# Patient Record
Sex: FEMALE | Race: WHITE | Employment: UNEMPLOYED | URBAN - NONMETROPOLITAN AREA
[De-identification: names, ages, dates, MRNs, and addresses within clinical notes are randomized per-mention and may not be internally consistent; named-entity substitution may affect disease eponyms.]

---

## 2018-02-04 ENCOUNTER — HISTORY (OUTPATIENT)
Dept: FAMILY MEDICINE | Facility: OTHER | Age: 33
End: 2018-02-04

## 2018-02-04 ENCOUNTER — OFFICE VISIT - GICH (OUTPATIENT)
Dept: FAMILY MEDICINE | Facility: OTHER | Age: 33
End: 2018-02-04

## 2018-02-04 DIAGNOSIS — F15.10 OTHER STIMULANT ABUSE, UNCOMPLICATED (H): ICD-10-CM

## 2018-02-04 DIAGNOSIS — Z32.00 ENCOUNTER FOR PREGNANCY TEST: ICD-10-CM

## 2018-02-04 DIAGNOSIS — F11.10 UNCOMPLICATED OPIOID ABUSE (H): ICD-10-CM

## 2018-02-04 DIAGNOSIS — Z72.51 HIGH RISK HETEROSEXUAL BEHAVIOR: ICD-10-CM

## 2018-02-04 DIAGNOSIS — Z20.2 CONTACT WITH AND (SUSPECTED) EXPOSURE TO INFECTIONS WITH A PREDOMINANTLY SEXUAL MODE OF TRANSMISSION: ICD-10-CM

## 2018-02-04 LAB — HCG UR QL: NEGATIVE

## 2018-02-04 ASSESSMENT — PATIENT HEALTH QUESTIONNAIRE - PHQ9: SUM OF ALL RESPONSES TO PHQ QUESTIONS 1-9: 21

## 2018-02-05 LAB
HBSAG CATEGORY - HISTORICAL: NONREACTIVE
HIV-1/HIV-2 ANTIBODY CATEGORY - HISTORICAL: NORMAL

## 2018-02-06 LAB
HEPATITIS C ANTIBODY CATEGORY - HISTORICAL: ABNORMAL
TREPONEMA PALLIDUM - HISTORICAL: NEGATIVE

## 2018-02-08 LAB — HCV RNA RT-PCR - HISTORICAL: ABNORMAL IU/ML

## 2018-02-09 VITALS
DIASTOLIC BLOOD PRESSURE: 68 MMHG | WEIGHT: 131.25 LBS | TEMPERATURE: 98.4 F | HEART RATE: 64 BPM | SYSTOLIC BLOOD PRESSURE: 120 MMHG

## 2018-02-11 ASSESSMENT — PATIENT HEALTH QUESTIONNAIRE - PHQ9: SUM OF ALL RESPONSES TO PHQ QUESTIONS 1-9: 21

## 2018-02-13 ENCOUNTER — TELEPHONE (OUTPATIENT)
Dept: FAMILY MEDICINE | Facility: OTHER | Age: 33
End: 2018-02-13

## 2018-02-13 NOTE — LETTER
February 15, 2018      Crys Hidalgo  [address]   [place] [address]        Dear ,    We are writing to inform you of your test results.    I did receive the report of your laboratory in the test did come back positive for hepatitis C.  I would recommend that you follow-up with your primary care physician for further recommendations.      If you have any questions or concerns, please call the clinic at the number listed above.       Sincerely,  Kd Bauer MD on 2/15/2018 at 3:16 PM      Kd Bauer MD

## 2018-02-13 NOTE — NURSING NOTE
Patient Information     Patient Name MRN Sex Crys Anderson [id number] Female 1985      Nursing Note by Amanda King at 2018 12:30 PM     Author:  Amanda King Service:  (none) Author Type:  (none)     Filed:  2018 12:12 PM Encounter Date:  2018 Status:  Signed     :  Amanda King            Patient presents to the clinic for vaginal discharge and odor that started 2 days ago. Patient has concerns regarding possible std and would also like to get a pregnancy test.  Amanda RODRIGUEZ CMA..AAMA.....2018..12:01 PM

## 2018-02-13 NOTE — TELEPHONE ENCOUNTER
Transferred from [facility]:  Patient is at [facility]. Please advise her at the hepatitis C is positive. She should follow-up with her primary doctor when she is discharged.   Left message for [facility] to call back.  Parisa Rob LPN ....................  2/13/2018   9:37 AM

## 2018-02-13 NOTE — PROGRESS NOTES
Patient Information     Patient Name MRN Crys Tesfaye [id number] Female 1985      Progress Notes by Kd Bauer MD at 2018 12:30 PM     Author:  Kd Bauer MD Service:  (none) Author Type:  Physician     Filed:  2018 12:51 PM Encounter Date:  2018 Status:  Signed     :  Kd Bauer MD (Physician)            SUBJECTIVE:    Crys Hidalgo is a 32 y.o. female who presents for STD check    HPI Comments: Patient arrives here requesting an STD check. She is currently at [facility] for math amphetamine and opiate abuse. She has used IV drugs in the past. Also reports that she's been very promiscuous. She states her  has a history of hepatitis C which she did not know until recently. She is requesting a STD workup. She does report a vaginal discharge. Patient states that she does have a history of bacterial vaginosis but this is different. She does decline a pelvic exam.      No Known Allergies,   Family History      Problem  Relation Age of Onset     Good Health Mother      Good Health Father      Good Health Daughter      Good Health Son      Good Health Daughter      Good Health Daughter    ,   Current Outpatient Prescriptions on File Prior to Visit       Medication  Sig Dispense Refill     FLUoxetine (PROZAC) 20 mg capsule Take 1 capsule by mouth every morning. 30 capsule 3     ibuprofen (ADVIL; MOTRIN) 800 mg tablet Take 1 tablet by mouth 3 times daily with meals.  0     prazosin (MINIPRESS) 1 mg capsule Take 1 capsule by mouth at bedtime. 30 capsule 3     traZODone (DESYREL) 50 mg tablet Take 1 tablet by mouth at bedtime. 30 tablet 4     No current facility-administered medications on file prior to visit.    , No past surgical history on file.,   Social History      Substance Use Topics        Smoking status:  Current Some Day Smoker     Packs/day: 0.50     Types: Cigarettes     Smokeless tobacco:  Never Used     Alcohol use  No     and   Social History      Substance Use Topics        Smoking status:  Current Some Day Smoker     Packs/day: 0.50     Types: Cigarettes     Smokeless tobacco:  Never Used     Alcohol use  No       REVIEW OF SYSTEMS:  ROS    OBJECTIVE:  /68 (Cuff Site: Left Arm, Position: Sitting, Cuff Size: Adult Regular)  Pulse 64  Temp 98.4  F (36.9  C) (Tympanic)  Wt 59.5 kg (131 lb 4 oz)  LMP 01/14/2018  Breastfeeding? No  BMI 20.56 kg/m2    EXAM:   Physical Exam   Constitutional: She is well-developed, well-nourished, and in no distress.   HENT:   Head: Normocephalic and atraumatic.   Eyes: Pupils are equal, round, and reactive to light.   Neck: Normal range of motion.   Pulmonary/Chest: Effort normal.   Neurological: She is alert.   Psychiatric: Affect normal.       ASSESSMENT/PLAN:    ICD-10-CM    1. Possible exposure to STD Z20.2 GC CHLAMYDIA TRACH PROBE      GC CHLAMYDIA TRACH PROBE   2. Possible pregnancy Z32.00 Urine pregnancy test (HCG), qualitative      Urine pregnancy test (HCG), qualitative   3. Methamphetamine abuse F15.10    4. High risk sexual behavior Z72.51 HIV 1 & 2      ANTI HCV      HBSAG (HBS)      TREPONEMA PALLIDUM      HIV 1 & 2      ANTI HCV      HBSAG (HBS)      TREPONEMA PALLIDUM      CANCELED: TREPONEMA PALLIDUM   5. Opiate abuse, episodic F11.10         Plan:  Laboratories pending. Advised patient if she is positive we'll attempt to get a hold of her at [facility]. She indicates that the plan is to [place] her [place]. Advised her if they're normal I will send her the report in the mail. She is agreeable.

## 2018-02-15 NOTE — TELEPHONE ENCOUNTER
Called [facility] no patient by this name, please mail letter to home address. Janette Mckay LPN .......................2/15/2018  12:36 PM

## 2018-07-24 NOTE — PROGRESS NOTES
Patient Information     Patient Name  Crys Pandey MRN  [id number] Sex  Female   1985      Letter by Kd Bauer MD at      Author:  Kd Bauer MD Service:  (none) Author Type:  (none)    Filed:   Encounter Date:  2018 Status:  (Other)           Crys Hidalgo  [address]  [facility] [place] [facility] [address]          2018    Dear Ms. Hidalgo:    Enclosed is a fear of your tests that have so far come back as you can see there negative. We are still waiting for about 4 tests which have been sent out. I'll let should know those results when I receive them.  Results for orders placed or performed in visit on 18       GC CHLAMYDIA TRACH PROBE       Result  Value Ref Range Status    CHLAMYDIA PCR NOT Detected NOT Detected, Invalid Final    N GONORRHOEAE PCR NOT Detected NOT Detected, Invalid Final   Urine pregnancy test (HCG), qualitative       Result  Value Ref Range Status    PREGNANCY,URINE           Negative Negative Final       Kd Bauer MD ....................  2018   8:14 AM

## 2018-08-13 ENCOUNTER — HOSPITAL ENCOUNTER (EMERGENCY)
Facility: OTHER | Age: 33
Discharge: HOME | End: 2018-08-13
Attending: EMERGENCY MEDICINE | Admitting: EMERGENCY MEDICINE

## 2018-08-13 VITALS
SYSTOLIC BLOOD PRESSURE: 123 MMHG | WEIGHT: 155 LBS | DIASTOLIC BLOOD PRESSURE: 80 MMHG | OXYGEN SATURATION: 97 % | BODY MASS INDEX: 24.33 KG/M2 | HEIGHT: 67 IN | RESPIRATION RATE: 16 BRPM | HEART RATE: 112 BPM | TEMPERATURE: 98.6 F

## 2018-08-13 DIAGNOSIS — R10.84 ABDOMINAL PAIN, GENERALIZED: ICD-10-CM

## 2018-08-13 LAB
ALBUMIN SERPL-MCNC: 4 G/DL (ref 3.5–5.7)
ALBUMIN UR-MCNC: NEGATIVE MG/DL
ALP SERPL-CCNC: 56 U/L (ref 34–104)
ALT SERPL W P-5'-P-CCNC: 47 U/L (ref 7–52)
ANION GAP SERPL CALCULATED.3IONS-SCNC: 5 MMOL/L (ref 3–14)
APPEARANCE UR: CLEAR
AST SERPL W P-5'-P-CCNC: 23 U/L (ref 13–39)
BASOPHILS # BLD AUTO: 0.1 10E9/L (ref 0–0.2)
BASOPHILS NFR BLD AUTO: 0.7 %
BILIRUB SERPL-MCNC: 0.2 MG/DL (ref 0.3–1)
BILIRUB UR QL STRIP: NEGATIVE
BUN SERPL-MCNC: 17 MG/DL (ref 7–25)
CALCIUM SERPL-MCNC: 9.4 MG/DL (ref 8.6–10.3)
CHLORIDE SERPL-SCNC: 105 MMOL/L (ref 98–107)
CO2 SERPL-SCNC: 29 MMOL/L (ref 21–31)
COLOR UR AUTO: YELLOW
CREAT SERPL-MCNC: 0.84 MG/DL (ref 0.6–1.2)
DIFFERENTIAL METHOD BLD: NORMAL
EOSINOPHIL # BLD AUTO: 0.3 10E9/L (ref 0–0.7)
EOSINOPHIL NFR BLD AUTO: 2.5 %
ERYTHROCYTE [DISTWIDTH] IN BLOOD BY AUTOMATED COUNT: 13.3 % (ref 10–15)
GFR SERPL CREATININE-BSD FRML MDRD: 78 ML/MIN/1.7M2
GLUCOSE SERPL-MCNC: 123 MG/DL (ref 70–105)
GLUCOSE UR STRIP-MCNC: NEGATIVE MG/DL
HCG UR QL: NEGATIVE
HCT VFR BLD AUTO: 39.8 % (ref 35–47)
HGB BLD-MCNC: 13.3 G/DL (ref 11.7–15.7)
HGB UR QL STRIP: NEGATIVE
IMM GRANULOCYTES # BLD: 0 10E9/L (ref 0–0.4)
IMM GRANULOCYTES NFR BLD: 0.2 %
KETONES UR STRIP-MCNC: NEGATIVE MG/DL
LEUKOCYTE ESTERASE UR QL STRIP: NEGATIVE
LIPASE SERPL-CCNC: 53 U/L (ref 11–82)
LYMPHOCYTES # BLD AUTO: 4.4 10E9/L (ref 0.8–5.3)
LYMPHOCYTES NFR BLD AUTO: 41.1 %
MCH RBC QN AUTO: 31.3 PG (ref 26.5–33)
MCHC RBC AUTO-ENTMCNC: 33.4 G/DL (ref 31.5–36.5)
MCV RBC AUTO: 94 FL (ref 78–100)
MONOCYTES # BLD AUTO: 0.8 10E9/L (ref 0–1.3)
MONOCYTES NFR BLD AUTO: 7.3 %
NEUTROPHILS # BLD AUTO: 5.2 10E9/L (ref 1.6–8.3)
NEUTROPHILS NFR BLD AUTO: 48.2 %
NITRATE UR QL: NEGATIVE
PH UR STRIP: 7 PH (ref 5–9)
PLATELET # BLD AUTO: 256 10E9/L (ref 150–450)
POTASSIUM SERPL-SCNC: 3.8 MMOL/L (ref 3.5–5.1)
PROT SERPL-MCNC: 7.2 G/DL (ref 6.4–8.9)
RBC # BLD AUTO: 4.25 10E12/L (ref 3.8–5.2)
SODIUM SERPL-SCNC: 139 MMOL/L (ref 134–144)
SOURCE: NORMAL
SP GR UR STRIP: 1.01 (ref 1–1.03)
UROBILINOGEN UR STRIP-ACNC: 0.2 EU/DL (ref 0.2–1)
WBC # BLD AUTO: 10.8 10E9/L (ref 4–11)

## 2018-08-13 PROCEDURE — 81025 URINE PREGNANCY TEST: CPT | Performed by: EMERGENCY MEDICINE

## 2018-08-13 PROCEDURE — 99283 EMERGENCY DEPT VISIT LOW MDM: CPT | Mod: Z6 | Performed by: EMERGENCY MEDICINE

## 2018-08-13 PROCEDURE — 36415 COLL VENOUS BLD VENIPUNCTURE: CPT | Performed by: EMERGENCY MEDICINE

## 2018-08-13 PROCEDURE — 99283 EMERGENCY DEPT VISIT LOW MDM: CPT | Performed by: EMERGENCY MEDICINE

## 2018-08-13 PROCEDURE — 85025 COMPLETE CBC W/AUTO DIFF WBC: CPT | Performed by: EMERGENCY MEDICINE

## 2018-08-13 PROCEDURE — 80053 COMPREHEN METABOLIC PANEL: CPT | Performed by: EMERGENCY MEDICINE

## 2018-08-13 PROCEDURE — 83690 ASSAY OF LIPASE: CPT | Performed by: EMERGENCY MEDICINE

## 2018-08-13 PROCEDURE — 81003 URINALYSIS AUTO W/O SCOPE: CPT | Performed by: EMERGENCY MEDICINE

## 2018-08-13 ASSESSMENT — ENCOUNTER SYMPTOMS
FEVER: 0
ABDOMINAL PAIN: 1
FATIGUE: 0
VOMITING: 0
NAUSEA: 0
DIARRHEA: 0
CHILLS: 0

## 2018-08-13 NOTE — ED TRIAGE NOTES
COLUMBIA-SUICIDE SEVERITY RATING SCALE   Screen with Triage Points for Emergency Department      Ask questions that are bolded and underlined.   Past  month   Ask Questions 1 and 2 YES NO   1)  Have you wished you were dead or wished you could go to sleep and not wake up?   X   2)  Have you actually had any thoughts of killing yourself?   X   If YES to 2, ask questions 3, 4, 5, and 6.  If NO to 2, go directly to question 6.   3)  Have you been thinking about how you might do this?   E.g.  I thought about taking an overdose but I never made a specific plan as to when where or how I would actually do it .and I would never go through with it.       4)  Have you had these thoughts and had some intention of acting on them?   As opposed to  I have the thoughts but I definitely will not do anything about them.       5)  Have you started to work out or worked out the details of how to kill yourself? Do you intend to carry out this plan?      6)  Have you ever done anything, started to do anything, or prepared to do anything to end your life?  Examples: Collected pills, obtained a gun, gave away valuables, wrote a will or suicide note, took out pills but didn t swallow any, held a gun but changed your mind or it was grabbed from your hand, went to the roof but didn t jump; or actually took pills, tried to shoot yourself, cut yourself, tried to hang yourself, etc.    If YES, ask: Was this within the past three months?  Lifetime    X     Past 3 Months    X    Item 1:  Behavioral Health Referral at Discharge  Item 2:  Behavioral Health Referral at Discharge   Item 3:  Behavioral Health Consult (Psychiatric Nurse/) and consider Patient Safety Precautions  Item 4:  Immediate Notification of Physician and/or Behavioral Health and Patient Safety Precautions   Item 5:  Immediate Notification of Physician and/or Behavioral Health and Patient Safety Precautions  Item 6:  Over 3 months ago: Behavioral Health Consult  (Psychiatric Nurse/) and consider Patient Safety Precautions  OR  Item 6:  3 months ago or less: Immediate Notification of Physician and/or Behavioral Health and Patient Safety Precautions

## 2018-08-13 NOTE — ED TRIAGE NOTES
Pt presents to the ER from [facility] inpatient unit.  Pt states she was awakened from sleep with abdominal pain in the area of her umbilicus.  Pt denies nausea or vomiting.  States she is very thirsty.  States she has more pain with ambulation.  Pt states she had a normal bowel movement today.  Denies all urinary symptoms.

## 2018-08-13 NOTE — ED PROVIDER NOTES
History     Chief Complaint   Patient presents with     Abdominal Pain     HPI Comments: Is a 33-year-old female coming in from a local chemical dependency treatment center where she has been last 40 days for chronic meth abuse who tells me she has hepatitis C infection coming into the emergency room for acute mid right-sided abdominal pain which she says she woke up with from sleep earlier tonight associated with no fever, chills, body, nausea, vomiting, diarrhea, urinary symptoms or gross hematuria.  Patient states she had her IUD taken out 2 weeks ago and now she is having brownish vaginal discharge but no bleeding.  She is a she is not at all concerned about sexually transmitted disease stating that she was treated for it several weeks ago.  She says she has not had a sexual encounter since she has been treated for STD.  She denies history of inflammatory bowel disease and states she has been having regular bowel movement.      Problem List:    There are no active problems to display for this patient.       Past Medical History:    Past Medical History:   Diagnosis Date     Anxiety disorder      Major depressive disorder, single episode      Post-traumatic stress disorder        Past Surgical History:    History reviewed. No pertinent surgical history.    Family History:    Family History   Problem Relation Age of Onset     Family History Negative Mother      Good Health     Family History Negative Father      Good Health     Family History Negative Daughter      Good Health     Family History Negative Son      Good Health     Family History Negative Daughter      Good Health     Family History Negative Daughter      Good Health       Social History:  Marital Status:  Significant other [7]  Social History   Substance Use Topics     Smoking status: Current Some Day Smoker     Packs/day: 0.50     Types: Cigarettes     Smokeless tobacco: Never Used     Alcohol use No        Medications:      No current outpatient  "prescriptions on file.      Review of Systems   Constitutional: Negative for chills, fatigue and fever.   Gastrointestinal: Positive for abdominal pain. Negative for diarrhea, nausea and vomiting.   All other systems reviewed and are negative.      Physical Exam   BP: 123/80  Pulse: 112  Temp: 98.6  F (37  C)  Resp: 16  Height: 170.2 cm (5' 7\")  Weight: 70.3 kg (155 lb)  SpO2: 97 %      Physical Exam   Constitutional: She is oriented to person, place, and time. She appears well-developed and well-nourished. No distress.   Cardiovascular: Normal rate, regular rhythm, normal heart sounds and intact distal pulses.    Pulmonary/Chest: Effort normal and breath sounds normal. No respiratory distress. She has no wheezes. She has no rales. She exhibits no tenderness.   Abdominal: Soft. Bowel sounds are normal. She exhibits no distension. There is no rebound and no guarding.   Abdominal wall is soft but there is mild to moderate tenderness at the mid abdomen on the right side on palpation   Neurological: She is oriented to person, place, and time.       ED Course     Patient presents with right-sided mid abdominal pain which started earlier tonight.  Her examination reveals some increased fidgeting with the patient attributes to her being nervous or anxious.  She does have a mild mid right-sided abdominal tenderness on palpation but she has no concerning signs for acute abdomen.  Specifically she has no right lower or right upper quadrant abdominal tenderness.  Her basic lab workup including CBC, complete metabolic profile, pregnancy, UA and lipase lab results are unremarkable.  She is no longer having a mild tenderness that she had when she initially came in on reexamination.  Patient was discussed with the negative finding on her workup.  She was discussed possibility of this being an early manifestation of acute intra-abdominal processes including but not limited to acute appendicitis.  She was discussed being discharged " without further workup versus obtaining CT abdomen/pelvis to further evaluate her abdominal pain.  The patient heard the potential effects of ionizing radiation decided to be discharged home with instruction to return to ER should she develop worsening symptoms.    ED Course     Procedures               Critical Care time:  none               Results for orders placed or performed during the hospital encounter of 08/13/18 (from the past 24 hour(s))   CBC with platelets differential   Result Value Ref Range    WBC 10.8 4.0 - 11.0 10e9/L    RBC Count 4.25 3.8 - 5.2 10e12/L    Hemoglobin 13.3 11.7 - 15.7 g/dL    Hematocrit 39.8 35.0 - 47.0 %    MCV 94 78 - 100 fl    MCH 31.3 26.5 - 33.0 pg    MCHC 33.4 31.5 - 36.5 g/dL    RDW 13.3 10.0 - 15.0 %    Platelet Count 256 150 - 450 10e9/L    Diff Method Automated Method     % Neutrophils 48.2 %    % Lymphocytes 41.1 %    % Monocytes 7.3 %    % Eosinophils 2.5 %    % Basophils 0.7 %    % Immature Granulocytes 0.2 %    Absolute Neutrophil 5.2 1.6 - 8.3 10e9/L    Absolute Lymphocytes 4.4 0.8 - 5.3 10e9/L    Absolute Monocytes 0.8 0.0 - 1.3 10e9/L    Absolute Eosinophils 0.3 0.0 - 0.7 10e9/L    Absolute Basophils 0.1 0.0 - 0.2 10e9/L    Abs Immature Granulocytes 0.0 0 - 0.4 10e9/L   Comprehensive metabolic panel   Result Value Ref Range    Sodium 139 134 - 144 mmol/L    Potassium 3.8 3.5 - 5.1 mmol/L    Chloride 105 98 - 107 mmol/L    Carbon Dioxide 29 21 - 31 mmol/L    Anion Gap 5 3 - 14 mmol/L    Glucose 123 (H) 70 - 105 mg/dL    Urea Nitrogen 17 7 - 25 mg/dL    Creatinine 0.84 0.60 - 1.20 mg/dL    GFR Estimate 78 >60 mL/min/1.7m2    GFR Estimate If Black >90 >60 mL/min/1.7m2    Calcium 9.4 8.6 - 10.3 mg/dL    Bilirubin Total 0.2 (L) 0.3 - 1.0 mg/dL    Albumin 4.0 3.5 - 5.7 g/dL    Protein Total 7.2 6.4 - 8.9 g/dL    Alkaline Phosphatase 56 34 - 104 U/L    ALT 47 7 - 52 U/L    AST 23 13 - 39 U/L   Lipase   Result Value Ref Range    Lipase 53 11 - 82 U/L   *UA reflex to  Microscopic   Result Value Ref Range    Color Urine Yellow     Appearance Urine Clear     Glucose Urine Negative NEG^Negative mg/dL    Bilirubin Urine Negative NEG^Negative    Ketones Urine Negative NEG^Negative mg/dL    Specific Gravity Urine 1.015 1.000 - 1.030    Blood Urine Negative NEG^Negative    pH Urine 7.0 5.0 - 9.0 pH    Protein Albumin Urine Negative NEG^Negative mg/dL    Urobilinogen Urine 0.2 0.2 - 1.0 EU/dL    Nitrite Urine Negative NEG^Negative    Leukocyte Esterase Urine Negative NEG^Negative    Source Midstream Urine    HCG qualitative urine (UPT)   Result Value Ref Range    HCG Qual Urine Negative NEG^Negative       Medications - No data to display    Assessments & Plan (with Medical Decision Making)     I have reviewed the nursing notes.    I have reviewed the findings, diagnosis, plan and need for follow up with the patient.       New Prescriptions    No medications on file       Final diagnoses:   Abdominal pain, generalized       8/13/2018   [facility]     Shashi Harris MD  08/13/18 0138

## 2018-08-13 NOTE — ED AVS SNAPSHOT
[facility]    [address]    [place] [address]    Phone:  [phone]    Fax:  [phone]                                       Crys Hidalgo   MRN: [id number]    Department:  [facility]   Date of Visit:  8/13/2018           Patient Information     Date Of Birth          1985        Your diagnoses for this visit were:     Abdominal pain, generalized        You were seen by Shashi Harris MD.        Discharge Instructions       If the pain is progressively worsening return to ER    24 Hour Appointment Hotline     To schedule an appointment at [facility], please call [phone]. If you don't have a family doctor or clinic, we will help you find one. [facility] clinics are conveniently located to serve the needs of you and your family.           Review of your medicines      Notice     You have not been prescribed any medications.            Procedures and tests performed during your visit     *UA reflex to Microscopic    CBC with platelets differential    Comprehensive metabolic panel    HCG qualitative urine (UPT)    Lipase      Orders Needing Specimen Collection     None      Pending Results     No orders found from 8/11/2018 to 8/14/2018.            Pending Culture Results     No orders found from 8/11/2018 to 8/14/2018.            Pending Results Instructions     If you had any lab results that were not finalized at the time of your Discharge, you can call the ED Lab Result RN at [phone]. You will be contacted by this team for any positive Lab results or changes in treatment. The nurses are available 7 days a week from 10A to 6:30P.  You can leave a message 24 hours per day and they will return your call.        Thank you for choosing [facility]       Thank you for choosing [facility] for your care. Our goal is always to provide you with excellent care. Hearing back from our patients is one way we can continue to improve our services.  "Please take a few minutes to complete the written survey that you may receive in the mail after you visit with us. Thank you!        [employer] Assist Information     [facility] lets you send messages to your doctor, view your test results, renew your prescriptions, schedule appointments and more. To sign up, go to [email or web address] . Click on \"Log in\" on the left side of the screen, which will take you to the Welcome page. Then click on \"Sign up Now\" on the right side of the page.     You will be asked to enter the access code listed below, as well as some personal information. Please follow the directions to create your username and password.     Your access code is: [email or web address]  Expires: 2018  1:35 AM     Your access code will  in 90 days. If you need help or a new code, please call your [facility] clinic or [phone].        Care EveryWhere ID     This is your Care EveryWhere ID. This could be used by other organizations to access your [facility] medical records  [id number]        Equal Access to Services     [facility] HospitalKRISHNA : Hadmayur Spivey, wachey perez, qaybemmie pendletonalgemini chacon, tessie wong . So [facility] [phone].    ATENCIÓN: Si habla español, tiene a schneider disposición servicios gratuitos de asistencia lingüística. Llame al [phone].    We comply with applicable federal civil rights laws and [place] laws. We do not discriminate on the basis of race, color, national origin, age, disability, sex, sexual orientation, or gender identity.            After Visit Summary       This is your record. Keep this with you and show to your community pharmacist(s) and doctor(s) at your next visit.                  "

## 2018-08-13 NOTE — ED AVS SNAPSHOT
[facility]    [address]    [place] [address]    Phone:  [phone]    Fax:  [phone]                                       Crys Hidalgo   MRN: [id number]    Department:  [facility]   Date of Visit:  8/13/2018           After Visit Summary Signature Page     I have received my discharge instructions, and my questions have been answered. I have discussed any challenges I see with this plan with the nurse or doctor.    ..........................................................................................................................................  Patient/Patient Representative Signature      ..........................................................................................................................................  Patient Representative Print Name and Relationship to Patient    ..................................................               ................................................  Date                                            Time    ..........................................................................................................................................  Reviewed by Signature/Title    ...................................................              ..............................................  Date                                                            Time

## 2018-10-08 ENCOUNTER — OFFICE VISIT (OUTPATIENT)
Dept: FAMILY MEDICINE | Facility: OTHER | Age: 33
End: 2018-10-08
Payer: MEDICAID

## 2018-10-08 VITALS
BODY MASS INDEX: 26.54 KG/M2 | WEIGHT: 169.1 LBS | RESPIRATION RATE: 18 BRPM | HEART RATE: 105 BPM | TEMPERATURE: 98.8 F | HEIGHT: 67 IN | OXYGEN SATURATION: 98 %

## 2018-10-08 DIAGNOSIS — Z11.3 SCREEN FOR STD (SEXUALLY TRANSMITTED DISEASE): Primary | ICD-10-CM

## 2018-10-08 LAB
C TRACH DNA SPEC QL PROBE+SIG AMP: NOT DETECTED
N GONORRHOEA DNA SPEC QL PROBE+SIG AMP: NOT DETECTED
SPECIMEN SOURCE: NORMAL

## 2018-10-08 PROCEDURE — 99202 OFFICE O/P NEW SF 15 MIN: CPT | Performed by: PHYSICIAN ASSISTANT

## 2018-10-08 PROCEDURE — 87491 CHLMYD TRACH DNA AMP PROBE: CPT

## 2018-10-08 PROCEDURE — G0463 HOSPITAL OUTPT CLINIC VISIT: HCPCS | Performed by: PHYSICIAN ASSISTANT

## 2018-10-08 PROCEDURE — 87591 N.GONORRHOEAE DNA AMP PROB: CPT

## 2018-10-08 RX ORDER — IMIPRAMINE HCL 25 MG
25 TABLET ORAL
COMMUNITY
Start: 2018-08-22 | End: 2019-03-05

## 2018-10-08 RX ORDER — BUSPIRONE HYDROCHLORIDE 10 MG/1
5-10 TABLET ORAL
COMMUNITY
Start: 2018-04-18

## 2018-10-08 RX ORDER — PRAZOSIN HYDROCHLORIDE 1 MG/1
CAPSULE ORAL
COMMUNITY
Start: 2018-08-27

## 2018-10-08 RX ORDER — PRAZOSIN HYDROCHLORIDE 2 MG/1
CAPSULE ORAL
COMMUNITY
Start: 2018-08-29

## 2018-10-08 RX ORDER — HYDROXYZINE PAMOATE 25 MG/1
25-50 CAPSULE ORAL
COMMUNITY
Start: 2018-07-18

## 2018-10-08 RX ORDER — FLUOXETINE 40 MG/1
40 CAPSULE ORAL
COMMUNITY
Start: 2018-07-11

## 2018-10-08 ASSESSMENT — PAIN SCALES - GENERAL: PAINLEVEL: NO PAIN (0)

## 2018-10-08 NOTE — PROGRESS NOTES
"SUBJECTIVE: Crys Hidalgo is a 33 year old female who presents to [facility] for evaluation of STD. Her significant other has a rash in the pubic area following shaving. Patient feels like this is a razor rash. They decided to have testing today to rule out STD.     Sexually active with her male partner  No prior STD, she has had negative testing in the past  She is on BC - depo and she is current with injections  They do not use condoms  Asymptomatic for abdominal discomfort, vaginal irriation or discharge, rash, pain with intercourse    Past Medical History:   Diagnosis Date     Anxiety disorder     No Comments Provided     Major depressive disorder, single episode     No Comments Provided     Post-traumatic stress disorder     No Comments Provided     Current Outpatient Prescriptions   Medication     busPIRone (BUSPAR) 10 MG tablet     cholecalciferol (VITAMIN D3) 5000 units TABS tablet     FLUoxetine (PROZAC) 40 MG capsule     hydrOXYzine (VISTARIL) 25 MG capsule     imipramine (TOFRANIL) 25 MG tablet     prazosin (MINIPRESS) 1 MG capsule     prazosin (MINIPRESS) 2 MG capsule     No current facility-administered medications for this visit.         Allergies   Allergen Reactions     Latex Rash     ROS  General: feels well, no fever  Abdomen: negative  : negative    OBJECTIVE:   Vitals:    10/08/18 1534   Pulse: 105   Resp: 18   Temp: 98.8  F (37.1  C)   TempSrc: Tympanic   SpO2: 98%   Weight: 169 lb 1.6 oz (76.7 kg)   Height: 5' 7\" (1.702 m)     General: alert and active, NAD  Cardiac: normal RR, no murmur  Respiratory: normal respiration, clear to ausculation  Abdomen: soft, non tender, normal bowel sounds, no CVAT  : deferred by patient      Results for orders placed or performed in visit on 10/08/18   GC/Chlamydia by PCR - [facility]   Result Value Ref Range    Specimen Source Midstream Urine     Neisseria gonorrhoreae PCR Not Detected NDET^Not Detected    Chlamydia Trachomatis PCR Not Detected NDET^Not " Detected       ASSESSMENT:   (Z11.3) Screen for STD (sexually transmitted disease)  (primary encounter diagnosis)    Plan: GC/Chlamydia by PCR - [facility]    STD testing today to rule out chlamydia and gonorrhea  Urine testing was negative. Results telephoned to patient via the nursing pool.   Follow up with PCP if symptoms persist or worsen  Kamala King PA-C on 10/10/2018 at 6:16 PM

## 2018-10-08 NOTE — NURSING NOTE
"Chief Complaint   Patient presents with     STD       Initial Pulse 105  Temp 98.8  F (37.1  C) (Tympanic)  Resp 18  Ht 5' 7\" (1.702 m)  Wt 169 lb 1.6 oz (76.7 kg)  SpO2 98%  Breastfeeding? No  BMI 26.48 kg/m2 Estimated body mass index is 26.48 kg/(m^2) as calculated from the following:    Height as of this encounter: 5' 7\" (1.702 m).    Weight as of this encounter: 169 lb 1.6 oz (76.7 kg).  Medication Reconciliation: complete    Mikayla Licea, STEVAN   "

## 2018-10-08 NOTE — NURSING NOTE
Patient presents to the clinic for wanting STD check. States her and her boyfriend shaved, thinks she has an ingrown hair, but wants to be sure.   Mikayla Licea LPN............. October 8, 2018 3:27 PM

## 2018-10-08 NOTE — MR AVS SNAPSHOT
After Visit Summary   10/8/2018    Crys Hidalgo    MRN: [id number]           Patient Information     Date Of Birth          1985        Visit Information        Provider Department      10/8/2018 2:15 PM [facility] NURSE [facility]        Today's Diagnoses     Screen for STD (sexually transmitted disease)    -  1      Care Instructions    STD testing today to rule out chlamydia and gonorrhea  We will call you later today with results and recommendation.   Suspected STI, Culture Only  Your symptoms suggest that you may have a sexually transmitted infection (STI). The most common bacteria that cause STIs are chlamydia and gonorrhea. Both are highly contagious. They are passed by sexual contact with an infected partner.  Symptoms begin within 1 to 3 weeks after exposure. There is usually a discharge from the penis or vagina and burning during urination. Many women with one of these infections will have only mild symptoms or no symptoms at all early in the disease.  Culture tests have been taken. These will show if you have an infection with chlamydia or gonorrhea. These infections can be treated and cured with antibiotic medicine.  Home care  Do not have sex until you know that your test result is negative.  If a culture was done, call for the results. If the culture is positive, contact your healthcare provider, local clinic, or local public health department to be treated, or return to our facility.    You will be prescribed antibiotic medicine. Be sure to take all of the antibiotic as prescribed until it is gone or you are told to stop. Keep taking it even if you feel better.    Both you and your sexual partner or partners need to be treated, even if the partner has no symptoms.    Do not have sex until both you and your partner or partners have finished all antibiotic medicine and you are told that you are no longer contagious.  Learn about  safe sex  practices and use  these in the future. The safest sex is with a partner who has tested negative for STIs and only has sex with you. Condoms can help prevent the spread of gonorrhea and chlamydia, but are not a guarantee.  Follow-up care  Follow up with your healthcare provider or as advised by our staff. Call as directed for the results of your culture. If your culture test is positive and you are treated with an antibiotic, you should have another culture taken 4 to 6 weeks after treatment. This is to be sure the infection has cleared. Follow up with your provider or the public health department for complete STI screening, including HIV testing. For more information about STIs, call the CDC information line at 673-018-2094.  When to seek medical advice  Call your healthcare provider if any of these occur:    Fever of 100.4 F (38.0 C) or higher, or as directed    New pain in your lower belly (abdomen) or back or pain that gets worse    Unexpected vaginal bleeding    Weakness, dizziness, or fainting    Repeated vomiting    Inability to urinate because of pain    Rash or joint pain    Painful open sores on the penis or around the outer vagina    Enlarged painful lumps (lymph nodes) in the groin    Testicle pain or scrotal swelling in men  Date Last Reviewed: 9/25/2015 2000-2017 [employer]. [address] [place] [address]. All rights reserved. This information is not intended as a substitute for professional medical care. Always follow your healthcare professional's instructions.                Follow-ups after your visit        Follow-up notes from your care team     Return if symptoms worsen or fail to improve.      Who to contact     If you have questions or need follow up information about today's clinic visit or your schedule please contact [facility] AND [facility] directly at [phone].  Normal or non-critical lab and imaging results will be communicated to you by MyChart, letter or phone  "within 4 business days after the clinic has received the results. If you do not hear from us within 7 days, please contact the clinic through Vicar[employer] or phone. If you have a critical or abnormal lab result, we will notify you by phone as soon as possible.  Submit refill requests through [employer] or call your pharmacy and they will forward the refill request to us. Please allow 3 business days for your refill to be completed.          Additional Information About Your Visit        Vicarious Information     Vicarious lets you send messages to your doctor, view your test results, renew your prescriptions, schedule appointments and more. To sign up, go to [email or web address] . Click on \"Log in\" on the left side of the screen, which will take you to the Welcome page. Then click on \"Sign up Now\" on the right side of the page.     You will be asked to enter the access code listed below, as well as some personal information. Please follow the directions to create your username and password.     Your access code is: [email or web address]  Expires: 2018  1:35 AM     Your access code will  in 90 days. If you need help or a new code, please call your [place] clinic or [phone].        Care EveryWhere ID     This is your Care EveryWhere ID. This could be used by other organizations to access your [place] medical records  [id number]        Your Vitals Were     Pulse Temperature Respirations Height Pulse Oximetry Breastfeeding?    105 98.8  F (37.1  C) (Tympanic) 18 5' 7\" (1.702 m) 98% No    BMI (Body Mass Index)                   26.48 kg/m2            Blood Pressure from Last 3 Encounters:   18 123/80   18 120/68    Weight from Last 3 Encounters:   10/08/18 169 lb 1.6 oz (76.7 kg)   18 155 lb (70.3 kg)   18 131 lb 4 oz (59.5 kg)              We Performed the Following     GC/Chlamydia by PCR - [place],        Primary Care Provider Fax #    Physician No Ref-Primary [phone]       No address on " file        Equal Access to Services     [facility] : Hadii aad ku linda Spivey, wajoaquinada luqadaha, qareeseta kaparishyamini vogelchemayamini, tessie valdiviajarochoclaudia pruitt. So [facility] [phone].    ATENCIÓN: Si habla español, tiene a schneider disposición servicios gratuitos de asistencia lingüística. Llame al [phone].    We comply with applicable federal civil rights laws and [place] laws. We do not discriminate on the basis of race, color, national origin, age, disability, sex, sexual orientation, or gender identity.            Thank you!     Thank you for choosing [facility] AND [facility]  for your care. Our goal is always to provide you with excellent care. Hearing back from our patients is one way we can continue to improve our services. Please take a few minutes to complete the written survey that you may receive in the mail after your visit with us. Thank you!             Your Updated Medication List - Protect others around you: Learn how to safely use, store and throw away your medicines at www.disposemymeds.org.          This list is accurate as of 10/8/18  3:44 PM.  Always use your most recent med list.                   Brand Name Dispense Instructions for use Diagnosis    busPIRone 10 MG tablet    BUSPAR     Take 5-10 mg by mouth        cholecalciferol 5000 units Tabs tablet    vitamin D3          FLUoxetine 40 MG capsule    PROzac     Take 40 mg by mouth        hydrOXYzine 25 MG capsule    VISTARIL     Take 25-50 mg by mouth        imipramine 25 MG tablet    TOFRANIL     Take 25 mg by mouth        * prazosin 1 MG capsule    MINIPRESS     TAKE 1 CAPSULE BY MOUTH EVERY EVENING ALONG WITH A 2MG CAPSULE FOR ATOTAL DAILY DOSE OF 3MG.        * prazosin 2 MG capsule    MINIPRESS     TAKE 1 CAP BY MOUTH EVERY EVENING ALONG WITH A 1MG CAPSULE FOR A TOTAL DAILY DOSE OF 3MG        * Notice:  This list has 2 medication(s) that are the same as other medications prescribed for you. Read the directions  carefully, and ask your doctor or other care provider to review them with you.

## 2018-10-08 NOTE — PATIENT INSTRUCTIONS
STD testing today to rule out chlamydia and gonorrhea  We will call you later today with results and recommendation.   Suspected STI, Culture Only  Your symptoms suggest that you may have a sexually transmitted infection (STI). The most common bacteria that cause STIs are chlamydia and gonorrhea. Both are highly contagious. They are passed by sexual contact with an infected partner.  Symptoms begin within 1 to 3 weeks after exposure. There is usually a discharge from the penis or vagina and burning during urination. Many women with one of these infections will have only mild symptoms or no symptoms at all early in the disease.  Culture tests have been taken. These will show if you have an infection with chlamydia or gonorrhea. These infections can be treated and cured with antibiotic medicine.  Home care  Do not have sex until you know that your test result is negative.  If a culture was done, call for the results. If the culture is positive, contact your healthcare provider, local clinic, or local public health department to be treated, or return to our facility.    You will be prescribed antibiotic medicine. Be sure to take all of the antibiotic as prescribed until it is gone or you are told to stop. Keep taking it even if you feel better.    Both you and your sexual partner or partners need to be treated, even if the partner has no symptoms.    Do not have sex until both you and your partner or partners have finished all antibiotic medicine and you are told that you are no longer contagious.  Learn about  safe sex  practices and use these in the future. The safest sex is with a partner who has tested negative for STIs and only has sex with you. Condoms can help prevent the spread of gonorrhea and chlamydia, but are not a guarantee.  Follow-up care  Follow up with your healthcare provider or as advised by our staff. Call as directed for the results of your culture. If your culture test is positive and you are  treated with an antibiotic, you should have another culture taken 4 to 6 weeks after treatment. This is to be sure the infection has cleared. Follow up with your provider or the public health department for complete STI screening, including HIV testing. For more information about STIs, call the CDC information line at 330-802-3914.  When to seek medical advice  Call your healthcare provider if any of these occur:    Fever of 100.4 F (38.0 C) or higher, or as directed    New pain in your lower belly (abdomen) or back or pain that gets worse    Unexpected vaginal bleeding    Weakness, dizziness, or fainting    Repeated vomiting    Inability to urinate because of pain    Rash or joint pain    Painful open sores on the penis or around the outer vagina    Enlarged painful lumps (lymph nodes) in the groin    Testicle pain or scrotal swelling in men  Date Last Reviewed: 9/25/2015 2000-2017 [employer]. [address] [place] [address], [place] [address]. All rights reserved. This information is not intended as a substitute for professional medical care. Always follow your healthcare professional's instructions.

## 2018-10-19 ENCOUNTER — OFFICE VISIT (OUTPATIENT)
Dept: FAMILY MEDICINE | Facility: OTHER | Age: 33
End: 2018-10-19
Payer: MEDICAID

## 2018-10-19 VITALS
HEART RATE: 114 BPM | HEIGHT: 67 IN | OXYGEN SATURATION: 98 % | TEMPERATURE: 96.1 F | WEIGHT: 165.6 LBS | BODY MASS INDEX: 25.99 KG/M2 | SYSTOLIC BLOOD PRESSURE: 102 MMHG | DIASTOLIC BLOOD PRESSURE: 66 MMHG

## 2018-10-19 DIAGNOSIS — N91.2 AMENORRHEA: Primary | ICD-10-CM

## 2018-10-19 LAB — HCG UR QL: NEGATIVE

## 2018-10-19 PROCEDURE — 81025 URINE PREGNANCY TEST: CPT | Performed by: NURSE PRACTITIONER

## 2018-10-19 PROCEDURE — G0463 HOSPITAL OUTPT CLINIC VISIT: HCPCS | Performed by: NURSE PRACTITIONER

## 2018-10-19 PROCEDURE — 99213 OFFICE O/P EST LOW 20 MIN: CPT | Performed by: NURSE PRACTITIONER

## 2018-10-19 ASSESSMENT — PAIN SCALES - GENERAL: PAINLEVEL: NO PAIN (0)

## 2018-10-19 NOTE — NURSING NOTE
Patient here for pregnancy confirmation. Patient took at home test yesterday with light line. Patient is having nausea and loss of appetite. Parisa Rob LPN .............10/19/2018  1:27 PM

## 2018-10-19 NOTE — PROGRESS NOTES
"Nursing Notes:   Parisa Rob LPN  10/19/2018  1:27 PM  Unsigned  Patient here for pregnancy confirmation. Patient took at home test yesterday with light line. Patient is having nausea and loss of appetite. Parisa Rob LPN .............10/19/2018  1:27 PM      Crys Hidalgo is a 33 year old female who presents for pregnancy verification.     History of being pregnant - yes 5 times, babies at home 5  Home pregnancy test - Faint + yesterday  LMP - Depo last was in July,   Current symptoms - Nausea, decreased eating  Birth control use - Depo, had IUD prior to that  Planned pregnancy - Yes  Taking a prenatal multivitamin - no  Concerns about STDs - no  Using tobacco - yes  Using alcohol - no  Using recreational drugs - not currently, was recently in treatment for meth and heroin.   Fevers - no  Abdominal pain - No  Vaginal symptoms, bleeding, spotting, discharge - Concerns for spotting some. Thought it was her period.       Current Outpatient Prescriptions   Medication Sig Dispense Refill     busPIRone (BUSPAR) 10 MG tablet Take 5-10 mg by mouth       cholecalciferol (VITAMIN D3) 5000 units TABS tablet        FLUoxetine (PROZAC) 40 MG capsule Take 40 mg by mouth       hydrOXYzine (VISTARIL) 25 MG capsule Take 25-50 mg by mouth       imipramine (TOFRANIL) 25 MG tablet Take 25 mg by mouth       prazosin (MINIPRESS) 1 MG capsule TAKE 1 CAPSULE BY MOUTH EVERY EVENING ALONG WITH A 2MG CAPSULE FOR ATOTAL DAILY DOSE OF 3MG.       prazosin (MINIPRESS) 2 MG capsule TAKE 1 CAP BY MOUTH EVERY EVENING ALONG WITH A 1MG CAPSULE FOR A TOTAL DAILY DOSE OF 3MG         REVIEW OF SYSTEMS:  Refer to HPI.  All other ROS negative.    EXAM:   /66 (BP Location: Right arm, Patient Position: Sitting, Cuff Size: Adult Regular)  Pulse 114  Temp 96.1  F (35.6  C) (Tympanic)  Ht 5' 7.13\" (1.705 m)  Wt 165 lb 9.6 oz (75.1 kg)  LMP 07/19/2018  SpO2 98%  Breastfeeding? No  BMI 25.84 kg/m2    General Appearance: " Pleasant, alert, appropriate appearance for age. No acute distress  Chest/Respiratory Exam: Normal chest wall and respirations.  Cardiovascular Exam: Regular rate   Psychiatric Exam: Alert and oriented - appropriate affect.    LABS:    Results for orders placed or performed in visit on 10/19/18   Pregnancy, Urine (HCG)   Result Value Ref Range    HCG Qual Urine Negative NEG^Negative       ASSESSMENT AND PLAN:      Pregnancy test is negative.    Encouraged use of prenatal vitamins.   Encouraged avoidance of alcohol, tobacco and recreational drugs of any kind.         Reyna Davenport..................10/19/2018 1:25 PM

## 2018-10-19 NOTE — MR AVS SNAPSHOT
"              After Visit Summary   10/19/2018    Crys Hidalgo    MRN: [id number]           Patient Information     Date Of Birth          1985        Visit Information        Provider Department      10/19/2018 12:45 PM Reyna Davenport NP [facility]        Today's Diagnoses     Amenorrhea    -  1      Care Instructions    Test is negative here    Follow up with PCP for family planning.           Follow-ups after your visit        Who to contact     If you have questions or need follow up information about today's clinic visit or your schedule please contact [facility] directly at [phone].  Normal or non-critical lab and imaging results will be communicated to you by MyChart, letter or phone within 4 business days after the clinic has received the results. If you do not hear from us within 7 days, please contact the clinic through coresystemshart or phone. If you have a critical or abnormal lab result, we will notify you by phone as soon as possible.  Submit refill requests through [employer] or call your pharmacy and they will forward the refill request to us. Please allow 3 business days for your refill to be completed.          Additional Information About Your Visit        Care EveryWhere ID     This is your Care EveryWhere ID. This could be used by other organizations to access your [place] medical records  [id number]        Your Vitals Were     Pulse Temperature Height Last Period Pulse Oximetry Breastfeeding?    114 96.1  F (35.6  C) (Tympanic) 5' 7.13\" (1.705 m) 07/19/2018 98% No    BMI (Body Mass Index)                   25.84 kg/m2            Blood Pressure from Last 3 Encounters:   10/19/18 102/66   08/13/18 123/80   02/04/18 120/68    Weight from Last 3 Encounters:   10/19/18 165 lb 9.6 oz (75.1 kg)   10/08/18 169 lb 1.6 oz (76.7 kg)   08/13/18 155 lb (70.3 kg)              We Performed the Following     Pregnancy, Urine (HCG)        Primary Care " Provider Fax #    Physician No Ref-Primary [phone]       No address on file        Equal Access to Services     LUISA NIX : Hadii cheo clinton linda Spivey, wajoaquinada lumaryellen, qareeseta kaparishda nies, tessie hinain hayaamarquez zaratechloé conklin judith pruitt. So [facility] [phone].    ATENCIÓN: Si habla español, tiene a schneider disposición servicios gratuitos de asistencia lingüística. Llame al [phone].    We comply with applicable federal civil rights laws and [place] laws. We do not discriminate on the basis of race, color, national origin, age, disability, sex, sexual orientation, or gender identity.            Thank you!     Thank you for choosing [facility] AND [facility]  for your care. Our goal is always to provide you with excellent care. Hearing back from our patients is one way we can continue to improve our services. Please take a few minutes to complete the written survey that you may receive in the mail after your visit with us. Thank you!             Your Updated Medication List - Protect others around you: Learn how to safely use, store and throw away your medicines at www.disposemymeds.org.          This list is accurate as of 10/19/18  1:40 PM.  Always use your most recent med list.                   Brand Name Dispense Instructions for use Diagnosis    busPIRone 10 MG tablet    BUSPAR     Take 5-10 mg by mouth        cholecalciferol 5000 units Tabs tablet    vitamin D3          FLUoxetine 40 MG capsule    PROzac     Take 40 mg by mouth        hydrOXYzine 25 MG capsule    VISTARIL     Take 25-50 mg by mouth        imipramine 25 MG tablet    TOFRANIL     Take 25 mg by mouth        * prazosin 1 MG capsule    MINIPRESS     TAKE 1 CAPSULE BY MOUTH EVERY EVENING ALONG WITH A 2MG CAPSULE FOR ATOTAL DAILY DOSE OF 3MG.        * prazosin 2 MG capsule    MINIPRESS     TAKE 1 CAP BY MOUTH EVERY EVENING ALONG WITH A 1MG CAPSULE FOR A TOTAL DAILY DOSE OF 3MG        * Notice:  This list has 2 medication(s) that are  the same as other medications prescribed for you. Read the directions carefully, and ask your doctor or other care provider to review them with you.

## 2018-10-23 ENCOUNTER — TELEPHONE (OUTPATIENT)
Dept: FAMILY MEDICINE | Facility: OTHER | Age: 33
End: 2018-10-23

## 2018-10-23 NOTE — TELEPHONE ENCOUNTER
I saw patient on 10/8/19 in [facility] for evaluation of STD. Patient had a negative screen for chlamydia and gonorrhea. Valtrex is used to treat HSV. There was no indication for this medication. If patient is symptomatic she should follow up with PCP. Thank you, BINA Rodriguez

## 2018-10-23 NOTE — TELEPHONE ENCOUNTER
Patient never received medication for valtrex. Please send to [facility]. Parisa Rob LPN .............10/23/2018  4:49 PM

## 2018-10-24 ENCOUNTER — OFFICE VISIT (OUTPATIENT)
Dept: FAMILY MEDICINE | Facility: OTHER | Age: 33
End: 2018-10-24
Attending: PHYSICIAN ASSISTANT
Payer: MEDICAID

## 2018-10-24 VITALS
HEART RATE: 109 BPM | WEIGHT: 164 LBS | TEMPERATURE: 98.8 F | DIASTOLIC BLOOD PRESSURE: 78 MMHG | OXYGEN SATURATION: 98 % | SYSTOLIC BLOOD PRESSURE: 112 MMHG | BODY MASS INDEX: 25.59 KG/M2

## 2018-10-24 DIAGNOSIS — Z20.2 STD EXPOSURE: Primary | ICD-10-CM

## 2018-10-24 PROCEDURE — G0463 HOSPITAL OUTPT CLINIC VISIT: HCPCS | Performed by: NURSE PRACTITIONER

## 2018-10-24 PROCEDURE — 87491 CHLMYD TRACH DNA AMP PROBE: CPT | Performed by: PHYSICIAN ASSISTANT

## 2018-10-24 PROCEDURE — 87591 N.GONORRHOEAE DNA AMP PROB: CPT | Performed by: PHYSICIAN ASSISTANT

## 2018-10-24 PROCEDURE — 99213 OFFICE O/P EST LOW 20 MIN: CPT | Performed by: NURSE PRACTITIONER

## 2018-10-24 RX ORDER — AZITHROMYCIN 500 MG/1
1000 TABLET, FILM COATED ORAL ONCE
Qty: 2 TABLET | Refills: 0 | Status: SHIPPED | OUTPATIENT
Start: 2018-10-24 | End: 2019-03-05

## 2018-10-24 NOTE — MR AVS SNAPSHOT
After Visit Summary   10/24/2018    Crys Hidalgo    MRN: [id number]           Patient Information     Date Of Birth          1985        Visit Information        Provider Department      10/24/2018 2:45 PM Abby Rios APRN CNP [facility]        Today's Diagnoses     STD exposure    -  1      Care Instructions       *CHLAMYDIA [Female, Treated]    You have an infection called Chlamydia. This is a sexually transmitted disease (STD). It is highly contagious and passed by sexual contact with an infected partner. Chlamydia can infect the internal sex organs (cervix, uterus or Fallopian tubes). Less often, it can infect the mouth, throat and anus.  Women with an infection in the cervix may have no symptoms or only mild symptoms early in the illness. Therefore, it is possible to pass this infection without knowing you have it.  When symptoms do occur in a woman, they usually start 2-10 days after exposure. There may be a thick vaginal discharge and pain or burning when passing urine. If the infection spreads to the Fallopian tubes it is called pelvic inflammatory disease ( PID ). This causes lower abdominal pain and fever. If not treated, Chlamydia can cause infertility (unable to have children) by scarring the Fallopian tubes. PID also increases the risk of ectopic (tubal) pregnancy in the future.  Chlamydia can be treated and cured. Treatment is with antibiotic medicine.   HOME CARE:   1. Your sexual partner must be treated at the same time, even if there are no symptoms. Your partner should contact their own doctor or go to an urgent care clinic or the Public Health Department to be examined and treated.  2. Avoid sexual activity until both you and your partner have finished taking all of the antibiotic medicine, and your doctor has told you that you are cured.  3. Take all medicines until they are finished. Otherwise, symptoms may recur.  4. Learn about  safe sex   practices and use these in the future. The safest sex is with a partner who has tested negative and only has sex with you. Condoms offer protection from spreading some sexually transmitted diseases including Gonorrhea, Chlamydia and HIV, but are not a guarantee.  FOLLOW UP with your doctor or as advised by our staff. If a culture test was taken, you may call us in three days for the results, or as directed. Another culture test should be taken 4-6 weeks after treatment to be sure the infection has cleared. Follow up with your doctor or the Public Health Department for complete STD screening, including HIV testing. For more information about STD's, contact the National STD Hotline: 1-464.342.1876.  GET PROMPT MEDICAL ATTENTION if any of the following occur:    No improvement after three days of treatment    New or increasing lower abdominal pain or back pain    Unexpected vaginal bleeding    Weakness, dizziness or fainting    Repeated vomiting    Inability to urinate due to pain    Rash or joint pain    Painful open sores around the outer vagina    Enlarged painful lymph nodes (lumps) in the groin    7289-3673 [employer]. [address] [place] [address]. All rights reserved. This information is not intended as a substitute for professional medical care. Always follow your healthcare professional's instructions.  This information has been modified by your health care provider with permission from the publisher.            Follow-ups after your visit        Who to contact     If you have questions or need follow up information about today's clinic visit or your schedule please contact [facility] directly at [phone].  Normal or non-critical lab and imaging results will be communicated to you by MyChart, letter or phone within 4 business days after the clinic has received the results. If you do not hear from us within 7 days, please contact the clinic through [employer] or  phone. If you have a critical or abnormal lab result, we will notify you by phone as soon as possible.  Submit refill requests through [employer] or call your pharmacy and they will forward the refill request to us. Please allow 3 business days for your refill to be completed.          Additional Information About Your Visit        Care EveryWhere ID     This is your Care EveryWhere ID. This could be used by other organizations to access your [place] medical records  [id number]        Your Vitals Were     Pulse Temperature Pulse Oximetry BMI (Body Mass Index)          109 98.8  F (37.1  C) (Tympanic) 98% 25.59 kg/m2         Blood Pressure from Last 3 Encounters:   10/24/18 112/78   10/19/18 102/66   08/13/18 123/80    Weight from Last 3 Encounters:   10/24/18 164 lb (74.4 kg)   10/19/18 165 lb 9.6 oz (75.1 kg)   10/08/18 169 lb 1.6 oz (76.7 kg)              We Performed the Following     GC/Chlamydia by PCR - [facility]          Today's Medication Changes          These changes are accurate as of 10/24/18  3:35 PM.  If you have any questions, ask your nurse or doctor.               Start taking these medicines.        Dose/Directions    azithromycin 500 MG tablet   Commonly known as:  ZITHROMAX   Used for:  STD exposure   Started by:  Abby Rios APRN CNP        Dose:  1000 mg   Take 2 tablets (1,000 mg) by mouth once for 1 dose   Quantity:  2 tablet   Refills:  0            Where to get your medicines      These medications were sent to [id number] - [place], [place] - [address]  [address] [facility] [address]     Phone:  [phone]     azithromycin 500 MG tablet                Primary Care Provider Fax #    Physician No Ref-Primary [phone]       No address on file        Equal Access to Services     LUISA NIX : Hayley Spivey, wachey perez, qaybta kaalmayamini chacon, tessie pruitt. So [facility]  [phone].    ATENCIÓN: Si alla guerra, tiene a schneider disposición servicios gratuitos de asistencia lingüística. Odalys medrano [phone].    We comply with applicable federal civil rights laws and [place] laws. We do not discriminate on the basis of race, color, national origin, age, disability, sex, sexual orientation, or gender identity.            Thank you!     Thank you for choosing [facility]  for your care. Our goal is always to provide you with excellent care. Hearing back from our patients is one way we can continue to improve our services. Please take a few minutes to complete the written survey that you may receive in the mail after your visit with us. Thank you!             Your Updated Medication List - Protect others around you: Learn how to safely use, store and throw away your medicines at www.disposemymeds.org.          This list is accurate as of 10/24/18  3:35 PM.  Always use your most recent med list.                   Brand Name Dispense Instructions for use Diagnosis    azithromycin 500 MG tablet    ZITHROMAX    2 tablet    Take 2 tablets (1,000 mg) by mouth once for 1 dose    STD exposure       busPIRone 10 MG tablet    BUSPAR     Take 5-10 mg by mouth        cholecalciferol 5000 units Tabs tablet    vitamin D3          FLUoxetine 40 MG capsule    PROzac     Take 40 mg by mouth        hydrOXYzine 25 MG capsule    VISTARIL     Take 25-50 mg by mouth        imipramine 25 MG tablet    TOFRANIL     Take 25 mg by mouth        * prazosin 1 MG capsule    MINIPRESS     TAKE 1 CAPSULE BY MOUTH EVERY EVENING ALONG WITH A 2MG CAPSULE FOR ATOTAL DAILY DOSE OF 3MG.        * prazosin 2 MG capsule    MINIPRESS     TAKE 1 CAP BY MOUTH EVERY EVENING ALONG WITH A 1MG CAPSULE FOR A TOTAL DAILY DOSE OF 3MG        * Notice:  This list has 2 medication(s) that are the same as other medications prescribed for you. Read the directions carefully, and ask your doctor or other care provider to  review them with you.

## 2018-10-24 NOTE — PATIENT INSTRUCTIONS
*CHLAMYDIA [Female, Treated]    You have an infection called Chlamydia. This is a sexually transmitted disease (STD). It is highly contagious and passed by sexual contact with an infected partner. Chlamydia can infect the internal sex organs (cervix, uterus or Fallopian tubes). Less often, it can infect the mouth, throat and anus.  Women with an infection in the cervix may have no symptoms or only mild symptoms early in the illness. Therefore, it is possible to pass this infection without knowing you have it.  When symptoms do occur in a woman, they usually start 2-10 days after exposure. There may be a thick vaginal discharge and pain or burning when passing urine. If the infection spreads to the Fallopian tubes it is called pelvic inflammatory disease ( PID ). This causes lower abdominal pain and fever. If not treated, Chlamydia can cause infertility (unable to have children) by scarring the Fallopian tubes. PID also increases the risk of ectopic (tubal) pregnancy in the future.  Chlamydia can be treated and cured. Treatment is with antibiotic medicine.   HOME CARE:   1. Your sexual partner must be treated at the same time, even if there are no symptoms. Your partner should contact their own doctor or go to an urgent care clinic or the Public Health Department to be examined and treated.  2. Avoid sexual activity until both you and your partner have finished taking all of the antibiotic medicine, and your doctor has told you that you are cured.  3. Take all medicines until they are finished. Otherwise, symptoms may recur.  4. Learn about  safe sex  practices and use these in the future. The safest sex is with a partner who has tested negative and only has sex with you. Condoms offer protection from spreading some sexually transmitted diseases including Gonorrhea, Chlamydia and HIV, but are not a guarantee.  FOLLOW UP with your doctor or as advised by our staff. If a culture test was taken, you may call us in  three days for the results, or as directed. Another culture test should be taken 4-6 weeks after treatment to be sure the infection has cleared. Follow up with your doctor or the Public Health Department for complete STD screening, including HIV testing. For more information about STD's, contact the National STD Hotline: 1-133.371.6802.  GET PROMPT MEDICAL ATTENTION if any of the following occur:    No improvement after three days of treatment    New or increasing lower abdominal pain or back pain    Unexpected vaginal bleeding    Weakness, dizziness or fainting    Repeated vomiting    Inability to urinate due to pain    Rash or joint pain    Painful open sores around the outer vagina    Enlarged painful lymph nodes (lumps) in the groin    9266-3350 [employer]. [address] [place] [address], [place] [address]. All rights reserved. This information is not intended as a substitute for professional medical care. Always follow your healthcare professional's instructions.  This information has been modified by your health care provider with permission from the publisher.

## 2018-10-24 NOTE — PROGRESS NOTES
HPI:    Crys Hidalgo is a 33 year old female who presents to clinic today for STD concerns. Her SO was tested positive for chlamydia 1 1/2 weeks ago, she was negative at the time. Neither of them used treatment. She is not having any sx currently.     Past Medical History:   Diagnosis Date     Anxiety disorder     No Comments Provided     Major depressive disorder, single episode     No Comments Provided     Post-traumatic stress disorder     No Comments Provided       History reviewed. No pertinent surgical history.    Family History   Problem Relation Age of Onset     Family History Negative Mother      Good Health     Family History Negative Father      Good Health     Family History Negative Daughter      Good Health     Family History Negative Son      Good Health     Family History Negative Daughter      Good Health     Family History Negative Daughter      Good Health       Social History     Social History     Marital status: Significant other     Spouse name: N/A     Number of children: N/A     Years of education: N/A     Occupational History     Not on file.     Social History Main Topics     Smoking status: Current Some Day Smoker     Packs/day: 0.50     Types: Cigarettes     Smokeless tobacco: Never Used     Alcohol use No     Drug use: No     Sexual activity: Yes     Partners: Male     Birth control/ protection: None     Other Topics Concern     Not on file     Social History Narrative    Moved to this area in 2014 from [place] with her boyfriend. Significant other is currently in long term.       Current Outpatient Prescriptions   Medication Sig Dispense Refill     busPIRone (BUSPAR) 10 MG tablet Take 5-10 mg by mouth       cholecalciferol (VITAMIN D3) 5000 units TABS tablet        FLUoxetine (PROZAC) 40 MG capsule Take 40 mg by mouth       hydrOXYzine (VISTARIL) 25 MG capsule Take 25-50 mg by mouth       imipramine (TOFRANIL) 25 MG tablet Take 25 mg by mouth       prazosin (MINIPRESS) 1 MG capsule  TAKE 1 CAPSULE BY MOUTH EVERY EVENING ALONG WITH A 2MG CAPSULE FOR ATOTAL DAILY DOSE OF 3MG.       prazosin (MINIPRESS) 2 MG capsule TAKE 1 CAP BY MOUTH EVERY EVENING ALONG WITH A 1MG CAPSULE FOR A TOTAL DAILY DOSE OF 3MG         Allergies   Allergen Reactions     Adhesive Tape      Latex Rash       ROS:  Pertinent positives and negatives are noted in HPI.    EXAM:  General appearance: well appearing female, in no acute distress  Psychological: normal affect, alert and pleasant  Lab:   Results for orders placed or performed in visit on 10/24/18   GC/Chlamydia by PCR - [facility]   Result Value Ref Range    Specimen Source Midstream Urine     Neisseria gonorrhoreae PCR Not Detected NDET^Not Detected    Chlamydia Trachomatis PCR Not Detected NDET^Not Detected       ASSESSMENT AND PLAN:    1. STD exposure        SO with positive chlamydia recent and did not have treatment nor did she. Will treat today for exposure. Recommend no sex for 14 days after treatment. Plan to f/u as needed.     Abby Rios..................10/24/2018 3:33 PM

## 2018-10-24 NOTE — NURSING NOTE
Patient presents to clinic today for STD screening. Patient was seen 2 weeks ago. Boyfriend was tested positive to chlamydia. He never received his treatment. Patient not having any symptoms but they have had intercourse.  Martita Saldana CMA..............10/24/2018........2:41 PM    Medication Reconciliation: complete    Martita Saldana CMA

## 2018-10-25 ENCOUNTER — TELEPHONE (OUTPATIENT)
Dept: FAMILY MEDICINE | Facility: OTHER | Age: 33
End: 2018-10-25

## 2018-10-27 NOTE — TELEPHONE ENCOUNTER
Called patient and verified last name and , results given and no further questions.      Rebeka Rosa LPN on 10/27/2018 at 6:12 PM

## 2018-12-07 ENCOUNTER — OFFICE VISIT (OUTPATIENT)
Dept: FAMILY MEDICINE | Facility: OTHER | Age: 33
End: 2018-12-07
Attending: NURSE PRACTITIONER
Payer: MEDICAID

## 2018-12-07 VITALS
SYSTOLIC BLOOD PRESSURE: 102 MMHG | BODY MASS INDEX: 26.64 KG/M2 | TEMPERATURE: 98.2 F | OXYGEN SATURATION: 98 % | HEART RATE: 110 BPM | DIASTOLIC BLOOD PRESSURE: 66 MMHG | WEIGHT: 170.7 LBS

## 2018-12-07 DIAGNOSIS — N76.0 BV (BACTERIAL VAGINOSIS): ICD-10-CM

## 2018-12-07 DIAGNOSIS — N89.8 VAGINAL ODOR: Primary | ICD-10-CM

## 2018-12-07 DIAGNOSIS — B96.89 BV (BACTERIAL VAGINOSIS): ICD-10-CM

## 2018-12-07 LAB
SPECIMEN SOURCE: ABNORMAL
WET PREP SPEC: ABNORMAL

## 2018-12-07 PROCEDURE — 99213 OFFICE O/P EST LOW 20 MIN: CPT | Performed by: NURSE PRACTITIONER

## 2018-12-07 PROCEDURE — 87210 SMEAR WET MOUNT SALINE/INK: CPT | Performed by: NURSE PRACTITIONER

## 2018-12-07 PROCEDURE — G0463 HOSPITAL OUTPT CLINIC VISIT: HCPCS

## 2018-12-07 RX ORDER — METRONIDAZOLE 500 MG/1
500 TABLET ORAL 2 TIMES DAILY
Qty: 14 TABLET | Refills: 0 | Status: SHIPPED | OUTPATIENT
Start: 2018-12-07 | End: 2019-02-22

## 2018-12-07 NOTE — NURSING NOTE
Patient presents to clinic today for a possible vaginal infection. Patient did 3 day monistat but she is still having an odor. Patient thinks she may have bacterial vaginosis.   Martita Saldana CMA..............12/7/2018........1:45 PM    Medication Reconciliation: complete    Martita Saldana CMA

## 2018-12-07 NOTE — MR AVS SNAPSHOT
After Visit Summary   2018    Crys Hidalgo    MRN: [id number]           Patient Information     Date Of Birth          1985        Visit Information        Provider Department      2018 1:45 PM Reyna Davenport NP [facility]        Today's Diagnoses     Vaginal odor    -  1    BV (bacterial vaginosis)          Care Instructions      Bacterial Vaginosis  You have a vaginal infection called bacterial vaginosis (BV). Both good and bad bacteria are present in a healthy vagina. BV occurs when these bacteria get out of balance. The number of bad bacteria increase. And the number of good bacteria decrease. Although BV is associated with sexual activity, it is not a sexually transmitted disease.  BV may or may not cause symptoms. If symptoms do occur, they can include:    Thin, gray, milky-white, or sometimes green discharge    Unpleasant odor or  fishy  smell    Itching, burning, or pain in or around the vagina  It is not known what causes BV, but certain factors can make the problem more likely. This can include:    Douching    Having sex with a new partner    Having sex with more than one partner  BV will sometimes go away on its own. But treatment is usually recommended. This is because untreated BV can increase the risk of more serious health problems such as:    Pelvic inflammatory disease (PID)     delivery (giving birth to a baby early if you re pregnant)    HIV and certain other sexually transmitted diseases (STDs)    Infection after surgery on the reproductive organs  Home care  General care    BV is most often treated with medicines called antibiotics. These may be given as pills or as a vaginal cream. If antibiotics are prescribed, be sure to use them exactly as directed. Also, be sure to complete all of the medicine, even if your symptoms go away.    Don't douche or having sex during treatment.    If you have sex with a female partner, ask your  healthcare provider if she should also be treated.  Prevention    Don't douche.    Don't have sex. If you do have sex, then take steps to lower your risk:  ? Use condoms when having sex.  ? Limit the number of sexual partners you have.  Follow-up care  Follow up with your healthcare provider, or as advised.  When to seek medical advice  Call your healthcare provider right away if:    You have a fever of 100.4 F (38 C) or higher, or as directed by your provider.    Your symptoms worsen, or they don t go away within a few days of starting treatment.    You have new pain in the lower belly or pelvic region.    You have side effects that bother you or a reaction to the pills or cream you re prescribed.    You or any partners you have sex with have new symptoms, such as a rash, joint pain, or sores.                Follow-ups after your visit        Follow-up notes from your care team     Return if symptoms worsen or fail to improve.      Who to contact     If you have questions or need follow up information about today's clinic visit or your schedule please contact [facility] directly at [phone].  Normal or non-critical lab and imaging results will be communicated to you by MyChart, letter or phone within 4 business days after the clinic has received the results. If you do not hear from us within 7 days, please contact the clinic through MyChart or phone. If you have a critical or abnormal lab result, we will notify you by phone as soon as possible.  Submit refill requests through [employer] or call your pharmacy and they will forward the refill request to us. Please allow 3 business days for your refill to be completed.          Additional Information About Your Visit        Care EveryWhere ID     This is your Care EveryWhere ID. This could be used by other organizations to access your [place] medical records  [id number]        Your Vitals Were     Pulse Temperature Pulse Oximetry BMI (Body  Mass Index)          110 98.2  F (36.8  C) (Tympanic) 98% 26.64 kg/m2         Blood Pressure from Last 3 Encounters:   12/07/18 102/66   10/24/18 112/78   10/19/18 102/66    Weight from Last 3 Encounters:   12/07/18 170 lb 11.2 oz (77.4 kg)   10/24/18 164 lb (74.4 kg)   10/19/18 165 lb 9.6 oz (75.1 kg)              We Performed the Following     Wet Prep, Genital          Today's Medication Changes          These changes are accurate as of 12/7/18  2:27 PM.  If you have any questions, ask your nurse or doctor.               Start taking these medicines.        Dose/Directions    metroNIDAZOLE 500 MG tablet   Commonly known as:  FLAGYL   Used for:  BV (bacterial vaginosis)   Started by:  Reyna Davenport NP        Dose:  500 mg   Take 1 tablet (500 mg) by mouth 2 times daily   Quantity:  14 tablet   Refills:  0            Where to get your medicines      These medications were sent to [id number] - [place], [place] - [address]  [address] [facility], [facility] [address]     Phone:  [phone]     metroNIDAZOLE 500 MG tablet                Primary Care Provider Office Phone # Fax #    Melissa Velasco [phone]       [facility] [address] [facility] [place] [facility] [address]        Equal Access to Services     [facility] Center[facility]NA AH: Hadii cheo thakkaro Sosergey, waaxda luqadaha, qaybta kaalmada ines, tessie wong . So [facility] [phone].    ATENCIÓN: Si habla español, tiene a schneider disposición servicios gratuitos de asistencia lingüística. Llame al [phone].    We comply with applicable federal civil rights laws and [place] laws. We do not discriminate on the basis of race, color, national origin, age, disability, sex, sexual orientation, or gender identity.            Thank you!     Thank you for choosing [facility]  for your care. Our goal is always to provide you with excellent care. Hearing back from our patients is  one way we can continue to improve our services. Please take a few minutes to complete the written survey that you may receive in the mail after your visit with us. Thank you!             Your Updated Medication List - Protect others around you: Learn how to safely use, store and throw away your medicines at www.disposemymeds.org.          This list is accurate as of 12/7/18  2:27 PM.  Always use your most recent med list.                   Brand Name Dispense Instructions for use Diagnosis    busPIRone 10 MG tablet    BUSPAR     Take 5-10 mg by mouth        cholecalciferol 5000 units Tabs tablet    vitamin D3          FLUoxetine 40 MG capsule    PROzac     Take 40 mg by mouth        hydrOXYzine 25 MG capsule    VISTARIL     Take 25-50 mg by mouth        imipramine 25 MG tablet    TOFRANIL     Take 25 mg by mouth        metroNIDAZOLE 500 MG tablet    FLAGYL    14 tablet    Take 1 tablet (500 mg) by mouth 2 times daily    BV (bacterial vaginosis)       * prazosin 1 MG capsule    MINIPRESS     TAKE 1 CAPSULE BY MOUTH EVERY EVENING ALONG WITH A 2MG CAPSULE FOR ATOTAL DAILY DOSE OF 3MG.        * prazosin 2 MG capsule    MINIPRESS     TAKE 1 CAP BY MOUTH EVERY EVENING ALONG WITH A 1MG CAPSULE FOR A TOTAL DAILY DOSE OF 3MG        * Notice:  This list has 2 medication(s) that are the same as other medications prescribed for you. Read the directions carefully, and ask your doctor or other care provider to review them with you.

## 2018-12-07 NOTE — PROGRESS NOTES
Nursing Notes:   Martita Saldana CMA  12/7/2018  1:47 PM  Unsigned  Patient presents to clinic today for a possible vaginal infection. Patient did 3 day monistat but she is still having an odor. Patient thinks she may have bacterial vaginosis.   Martita Saldana CMA..............12/7/2018........1:45 PM    Medication Reconciliation: complete    Martita Saldana CMA        SUBJECTIVE:   Crys Hidalgo is a 33 year old female who presents to clinic today for the following health issues:    Vaginal Symptoms      Duration: 4-5 days    Description  odor    Intensity:  moderate    Accompanying signs and symptoms (fever/dysuria/abdominal or back pain): Denies fevers, chills, dysuria, frequency.  Denies urinary problems.  Denies abdominal pain or flank pain.    History  Sexually active: yes, single partner, contraception - not needed  Possibility of pregnancy: Don't Know  Recent antibiotic use: no     Precipitating or alleviating factors: She did use a 3-day Monistat that was over-the-counter.  Without relief.  She denies changes in soaps, perfumes or any other items that may cause irritation.    Therapies tried and outcome: Monistat   Outcome: No change.        Problem list and histories reviewed & adjusted, as indicated.  Additional history: as documented    Current Outpatient Prescriptions   Medication Sig Dispense Refill     metroNIDAZOLE (FLAGYL) 500 MG tablet Take 1 tablet (500 mg) by mouth 2 times daily 14 tablet 0     busPIRone (BUSPAR) 10 MG tablet Take 5-10 mg by mouth       cholecalciferol (VITAMIN D3) 5000 units TABS tablet        FLUoxetine (PROZAC) 40 MG capsule Take 40 mg by mouth       hydrOXYzine (VISTARIL) 25 MG capsule Take 25-50 mg by mouth       imipramine (TOFRANIL) 25 MG tablet Take 25 mg by mouth       prazosin (MINIPRESS) 1 MG capsule TAKE 1 CAPSULE BY MOUTH EVERY EVENING ALONG WITH A 2MG CAPSULE FOR ATOTAL DAILY DOSE OF 3MG.       prazosin (MINIPRESS) 2 MG capsule TAKE 1 CAP BY MOUTH EVERY  EVENING ALONG WITH A 1MG CAPSULE FOR A TOTAL DAILY DOSE OF 3MG       Allergies   Allergen Reactions     Adhesive Tape      Latex Rash         ROS:  Notable findings in the HPI.       OBJECTIVE:     /66  Pulse 110  Temp 98.2  F (36.8  C) (Tympanic)  Wt 170 lb 11.2 oz (77.4 kg)  SpO2 98%  BMI 26.64 kg/m2  Body mass index is 26.64 kg/(m^2).  GENERAL: healthy, alert and no distress  EYES: Eyes grossly normal to inspection  HENT: normal cephalic/atraumatic and oral mucous membranes moist  RESP: Without increased work of breathing  ABDOMEN: soft, nontender, no hepatosplenomegaly, no masses and bowel sounds normal   (female): normal female external genitalia, normal urethral meatus , vaginal mucosa pink, moist, well rugated and wet prep obtained  SKIN: no suspicious lesions or rashes    Diagnostic Test Results:  Results for orders placed or performed in visit on 12/07/18 (from the past 24 hour(s))   Wet Prep, Genital   Result Value Ref Range    Specimen Description Vagina     Wet Prep Clue cells seen (A)     Wet Prep No yeast seen     Wet Prep No Trichomonas seen        ASSESSMENT/PLAN:     1. Vaginal odor  - Wet Prep, Genital  Declined STD testing.    2. BV (bacterial vaginosis)  - metroNIDAZOLE (FLAGYL) 500 MG tablet; Take 1 tablet (500 mg) by mouth 2 times daily  Dispense: 14 tablet; Refill: 0      PLAN:    We will treat the bacterial vaginosis.  Hygiene is discussed.  She will follow-up with her primary if she has further concerns.    Followup:    If not improving or if condition worsens, follow up with your Primary Care Provider    I explained my diagnostic considerations and recommendations to the patient, who voiced understanding and agreement with the treatment plan. All questions were answered. We discussed potential side effects of any prescribed or recommended therapies, as well as expectations for response to treatments. She was advised to contact our office if there is no improvement or worsening  of conditions or symptoms.  If s/s worsen or persist, patient will either come back or follow up with PCP.    Disclaimer:  This note consists of words and symbols derived from keyboarding, dictation, or using voice recognition software. As a result, there may be errors in the script that have gone undetected. Please consider this when interpreting information found in this note.      Reyna Davenport NP, 12/7/2018 2:10 PM

## 2018-12-07 NOTE — PATIENT INSTRUCTIONS
Bacterial Vaginosis  You have a vaginal infection called bacterial vaginosis (BV). Both good and bad bacteria are present in a healthy vagina. BV occurs when these bacteria get out of balance. The number of bad bacteria increase. And the number of good bacteria decrease. Although BV is associated with sexual activity, it is not a sexually transmitted disease.  BV may or may not cause symptoms. If symptoms do occur, they can include:    Thin, gray, milky-white, or sometimes green discharge    Unpleasant odor or  fishy  smell    Itching, burning, or pain in or around the vagina  It is not known what causes BV, but certain factors can make the problem more likely. This can include:    Douching    Having sex with a new partner    Having sex with more than one partner  BV will sometimes go away on its own. But treatment is usually recommended. This is because untreated BV can increase the risk of more serious health problems such as:    Pelvic inflammatory disease (PID)     delivery (giving birth to a baby early if you re pregnant)    HIV and certain other sexually transmitted diseases (STDs)    Infection after surgery on the reproductive organs  Home care  General care    BV is most often treated with medicines called antibiotics. These may be given as pills or as a vaginal cream. If antibiotics are prescribed, be sure to use them exactly as directed. Also, be sure to complete all of the medicine, even if your symptoms go away.    Don't douche or having sex during treatment.    If you have sex with a female partner, ask your healthcare provider if she should also be treated.  Prevention    Don't douche.    Don't have sex. If you do have sex, then take steps to lower your risk:  ? Use condoms when having sex.  ? Limit the number of sexual partners you have.  Follow-up care  Follow up with your healthcare provider, or as advised.  When to seek medical advice  Call your healthcare provider right away if:    You  have a fever of 100.4 F (38 C) or higher, or as directed by your provider.    Your symptoms worsen, or they don t go away within a few days of starting treatment.    You have new pain in the lower belly or pelvic region.    You have side effects that bother you or a reaction to the pills or cream you re prescribed.    You or any partners you have sex with have new symptoms, such as a rash, joint pain, or sores.

## 2019-01-03 ENCOUNTER — HOSPITAL ENCOUNTER (EMERGENCY)
Facility: OTHER | Age: 34
Discharge: HOME | End: 2019-01-03
Attending: PHYSICIAN ASSISTANT | Admitting: PHYSICIAN ASSISTANT
Payer: MEDICAID

## 2019-01-03 VITALS
HEIGHT: 67 IN | HEART RATE: 85 BPM | RESPIRATION RATE: 16 BRPM | OXYGEN SATURATION: 100 % | TEMPERATURE: 97.3 F | DIASTOLIC BLOOD PRESSURE: 54 MMHG | SYSTOLIC BLOOD PRESSURE: 106 MMHG | BODY MASS INDEX: 28.25 KG/M2 | WEIGHT: 180 LBS

## 2019-01-03 DIAGNOSIS — S39.012A STRAIN OF LUMBAR REGION, INITIAL ENCOUNTER: ICD-10-CM

## 2019-01-03 DIAGNOSIS — M79.18 LUMBAR MUSCLE PAIN: ICD-10-CM

## 2019-01-03 LAB
ALBUMIN UR-MCNC: NEGATIVE MG/DL
APPEARANCE UR: CLEAR
BILIRUB UR QL STRIP: NEGATIVE
COLOR UR AUTO: YELLOW
GLUCOSE UR STRIP-MCNC: NEGATIVE MG/DL
HCG UR QL: NEGATIVE
HGB UR QL STRIP: NEGATIVE
KETONES UR STRIP-MCNC: NEGATIVE MG/DL
LEUKOCYTE ESTERASE UR QL STRIP: NEGATIVE
NITRATE UR QL: NEGATIVE
PH UR STRIP: 7.5 PH (ref 5–9)
SOURCE: NORMAL
SP GR UR STRIP: 1.01 (ref 1–1.03)
UROBILINOGEN UR STRIP-ACNC: 0.2 EU/DL (ref 0.2–1)

## 2019-01-03 PROCEDURE — 99283 EMERGENCY DEPT VISIT LOW MDM: CPT | Mod: Z6 | Performed by: PHYSICIAN ASSISTANT

## 2019-01-03 PROCEDURE — 81003 URINALYSIS AUTO W/O SCOPE: CPT | Performed by: STUDENT IN AN ORGANIZED HEALTH CARE EDUCATION/TRAINING PROGRAM

## 2019-01-03 PROCEDURE — 99284 EMERGENCY DEPT VISIT MOD MDM: CPT | Mod: 25 | Performed by: PHYSICIAN ASSISTANT

## 2019-01-03 PROCEDURE — 81025 URINE PREGNANCY TEST: CPT | Performed by: STUDENT IN AN ORGANIZED HEALTH CARE EDUCATION/TRAINING PROGRAM

## 2019-01-03 PROCEDURE — 25000128 H RX IP 250 OP 636: Performed by: PHYSICIAN ASSISTANT

## 2019-01-03 PROCEDURE — 96372 THER/PROPH/DIAG INJ SC/IM: CPT | Performed by: PHYSICIAN ASSISTANT

## 2019-01-03 PROCEDURE — 25000132 ZZH RX MED GY IP 250 OP 250 PS 637: Performed by: PHYSICIAN ASSISTANT

## 2019-01-03 RX ORDER — KETOROLAC TROMETHAMINE 30 MG/ML
60 INJECTION, SOLUTION INTRAMUSCULAR; INTRAVENOUS ONCE
Status: COMPLETED | OUTPATIENT
Start: 2019-01-03 | End: 2019-01-03

## 2019-01-03 RX ORDER — IBUPROFEN 800 MG/1
800 TABLET, FILM COATED ORAL EVERY 8 HOURS PRN
Qty: 60 TABLET | Refills: 0 | Status: SHIPPED | OUTPATIENT
Start: 2019-01-03 | End: 2019-03-05

## 2019-01-03 RX ORDER — CYCLOBENZAPRINE HCL 10 MG
10 TABLET ORAL 3 TIMES DAILY PRN
Qty: 20 TABLET | Refills: 0 | Status: SHIPPED | OUTPATIENT
Start: 2019-01-03 | End: 2019-03-05

## 2019-01-03 RX ORDER — HYDROCODONE BITARTRATE AND ACETAMINOPHEN 5; 325 MG/1; MG/1
1 TABLET ORAL EVERY 6 HOURS PRN
Qty: 10 TABLET | Refills: 0 | Status: SHIPPED | OUTPATIENT
Start: 2019-01-03 | End: 2019-03-05

## 2019-01-03 RX ORDER — CYCLOBENZAPRINE HCL 10 MG
10 TABLET ORAL ONCE
Status: COMPLETED | OUTPATIENT
Start: 2019-01-03 | End: 2019-01-03

## 2019-01-03 RX ORDER — FENTANYL CITRATE 50 UG/ML
100 INJECTION, SOLUTION INTRAMUSCULAR; INTRAVENOUS ONCE
Status: COMPLETED | OUTPATIENT
Start: 2019-01-03 | End: 2019-01-03

## 2019-01-03 RX ADMIN — FENTANYL CITRATE 100 MCG: 50 INJECTION INTRAMUSCULAR; INTRAVENOUS at 14:00

## 2019-01-03 RX ADMIN — CYCLOBENZAPRINE HYDROCHLORIDE 10 MG: 10 TABLET, FILM COATED ORAL at 13:59

## 2019-01-03 RX ADMIN — KETOROLAC TROMETHAMINE 60 MG: 30 INJECTION, SOLUTION INTRAMUSCULAR at 14:00

## 2019-01-03 ASSESSMENT — ENCOUNTER SYMPTOMS
CONFUSION: 0
DIFFICULTY URINATING: 0
SHORTNESS OF BREATH: 0
NECK STIFFNESS: 0
FLANK PAIN: 1
FEVER: 0
ARTHRALGIAS: 0
DYSURIA: 0
BACK PAIN: 1
ABDOMINAL PAIN: 0
COLOR CHANGE: 0
HEADACHES: 0
FREQUENCY: 0
EYE REDNESS: 0

## 2019-01-03 ASSESSMENT — MIFFLIN-ST. JEOR: SCORE: 1554.1

## 2019-01-03 NOTE — ED PROVIDER NOTES
History     Chief Complaint   Patient presents with     Flank Pain     painful sex     This is a 33-year-old female who has been having some right lower flank and back pain.  He denies any specific injury.  But she does have 2 children she is been taking care of.  Here for further evaluation at this time.            Problem List:    There are no active problems to display for this patient.       Past Medical History:    Past Medical History:   Diagnosis Date     Anxiety disorder      Major depressive disorder, single episode      Post-traumatic stress disorder        Past Surgical History:    History reviewed. No pertinent surgical history.    Family History:    Family History   Problem Relation Age of Onset     Family History Negative Mother         Good Health     Family History Negative Father         Good Health     Family History Negative Daughter         Good Health     Family History Negative Son         Good Health     Family History Negative Daughter         Good Health     Family History Negative Daughter         Good Health       Social History:  Marital Status:  Significant other [7]  Social History     Tobacco Use     Smoking status: Current Some Day Smoker     Packs/day: 0.50     Types: Cigarettes     Smokeless tobacco: Never Used   Substance Use Topics     Alcohol use: No     Alcohol/week: 0.0 oz     Drug use: No        Medications:      busPIRone (BUSPAR) 10 MG tablet   cholecalciferol (VITAMIN D3) 5000 units TABS tablet   FLUoxetine (PROZAC) 40 MG capsule   hydrOXYzine (VISTARIL) 25 MG capsule   imipramine (TOFRANIL) 25 MG tablet   metroNIDAZOLE (FLAGYL) 500 MG tablet   prazosin (MINIPRESS) 1 MG capsule   prazosin (MINIPRESS) 2 MG capsule         Review of Systems   Constitutional: Negative for fever.   HENT: Negative for congestion.    Eyes: Negative for redness.   Respiratory: Negative for shortness of breath.    Cardiovascular: Negative for chest pain.   Gastrointestinal: Negative for abdominal  "pain.   Genitourinary: Positive for flank pain. Negative for difficulty urinating, dysuria, frequency and urgency.   Musculoskeletal: Positive for back pain. Negative for arthralgias and neck stiffness.   Skin: Negative for color change.   Neurological: Negative for headaches.   Psychiatric/Behavioral: Negative for confusion.       Physical Exam   BP: 106/54  Pulse: 85  Temp: 97.3  F (36.3  C)  Resp: 16  Height: 170.2 cm (5' 7\")  Weight: 81.6 kg (180 lb)  SpO2: 100 %      Physical Exam   Constitutional: She appears well-developed and well-nourished. No distress.   HENT:   Head: Normocephalic and atraumatic.   Mouth/Throat: Oropharynx is clear and moist. No oropharyngeal exudate.   Eyes: Conjunctivae are normal. Pupils are equal, round, and reactive to light. No scleral icterus.   Neck: Neck supple.   Cardiovascular: Normal rate, regular rhythm, normal heart sounds and intact distal pulses.   Pulmonary/Chest: Effort normal and breath sounds normal. No respiratory distress.   Abdominal: Soft. Bowel sounds are normal. There is no tenderness.   Musculoskeletal: Normal range of motion. She exhibits no edema, tenderness or deformity.   Palpable lumbar back pain L5-S1.  Noted muscle tightening.  CMS x4.   Lymphadenopathy:     She has no cervical adenopathy.   Neurological: She is alert.   Skin: Skin is warm and dry. Capillary refill takes less than 2 seconds. No rash noted. She is not diaphoretic.   Psychiatric: She has a normal mood and affect.       ED Course        Procedures                   Results for orders placed or performed during the hospital encounter of 01/03/19 (from the past 24 hour(s))   *UA reflex to Microscopic   Result Value Ref Range    Color Urine Yellow     Appearance Urine Clear     Glucose Urine Negative NEG^Negative mg/dL    Bilirubin Urine Negative NEG^Negative    Ketones Urine Negative NEG^Negative mg/dL    Specific Gravity Urine 1.015 1.000 - 1.030    Blood Urine Negative NEG^Negative    pH " Urine 7.5 5.0 - 9.0 pH    Protein Albumin Urine Negative NEG^Negative mg/dL    Urobilinogen Urine 0.2 0.2 - 1.0 EU/dL    Nitrite Urine Negative NEG^Negative    Leukocyte Esterase Urine Negative NEG^Negative    Source Midstream Urine    HCG qualitative urine   Result Value Ref Range    HCG Qual Urine Negative NEG^Negative       Medications   fentaNYL (PF) (SUBLIMAZE) injection 100 mcg (not administered)   ketorolac (TORADOL) injection 60 mg (not administered)   cyclobenzaprine (FLEXERIL) tablet 10 mg (not administered)       Assessments & Plan (with Medical Decision Making)     I have reviewed the nursing notes.    I have reviewed the findings, diagnosis, plan and need for follow up with the patient.         Medication List      Started    cyclobenzaprine 10 MG tablet  Commonly known as:  FLEXERIL  10 mg, Oral, 3 TIMES DAILY PRN     HYDROcodone-acetaminophen 5-325 MG tablet  Commonly known as:  NORCO  1 tablet, Oral, EVERY 6 HOURS PRN     ibuprofen 800 MG tablet  Commonly known as:  ADVIL/MOTRIN  800 mg, Oral, EVERY 8 HOURS PRN            Final diagnoses:   Strain of lumbar region, initial encounter   Lumbar muscle pain   Afebrile.  Vital signs stable.  2-day history of increasing pain into the right flank and lower back.  UA shows no signs of hematuria or UTI.  Patient denies any specific injury.  She was given Flexeril, Toradol, and fentanyl IM in the ER.  Did seem to help her back somewhat.  I discussed a referral to physical therapy and she has a chiropractor that she would like to go to.  She will need to arrange this.  Rx for short course of Flexeril, Norco, and Motrin.  Discussed ice and hot therapies as well.  Follow-up with her primary care provider as needed for further evaluation and further medical management.    1/3/2019   [facility]     Rober Wood PA-C  01/03/19 7911

## 2019-01-03 NOTE — ED TRIAGE NOTES
Right flank pain, pain with sex and irregular vaginal bleeding for past couple weeks and has been really bad last 3 days.

## 2019-01-03 NOTE — ED AVS SNAPSHOT
[facility]  [address]  [place] [address]  Phone:  [phone]  Fax:  [phone]                                    Crys Hidalgo   MRN: [id number]    Department:  [facility]   Date of Visit:  1/3/2019           After Visit Summary Signature Page    I have received my discharge instructions, and my questions have been answered. I have discussed any challenges I see with this plan with the nurse or doctor.    ..........................................................................................................................................  Patient/Patient Representative Signature      ..........................................................................................................................................  Patient Representative Print Name and Relationship to Patient    ..................................................               ................................................  Date                                   Time    ..........................................................................................................................................  Reviewed by Signature/Title    ...................................................              ..............................................  Date                                               Time          22EPIC Rev 08/18

## 2019-01-12 ENCOUNTER — APPOINTMENT (OUTPATIENT)
Dept: ULTRASOUND IMAGING | Facility: OTHER | Age: 34
End: 2019-01-12
Payer: MEDICAID

## 2019-01-12 ENCOUNTER — HOSPITAL ENCOUNTER (EMERGENCY)
Facility: OTHER | Age: 34
Discharge: HOME | End: 2019-01-12
Attending: PHYSICIAN ASSISTANT | Admitting: PHYSICIAN ASSISTANT
Payer: MEDICAID

## 2019-01-12 VITALS
WEIGHT: 170 LBS | HEART RATE: 86 BPM | HEIGHT: 67 IN | OXYGEN SATURATION: 97 % | TEMPERATURE: 97.9 F | SYSTOLIC BLOOD PRESSURE: 124 MMHG | BODY MASS INDEX: 26.68 KG/M2 | DIASTOLIC BLOOD PRESSURE: 83 MMHG | RESPIRATION RATE: 20 BRPM

## 2019-01-12 DIAGNOSIS — R10.31 ABDOMINAL PAIN, RIGHT LOWER QUADRANT: ICD-10-CM

## 2019-01-12 LAB
ALBUMIN SERPL-MCNC: 4.1 G/DL (ref 3.5–5.7)
ALBUMIN UR-MCNC: NEGATIVE MG/DL
ALP SERPL-CCNC: 69 U/L (ref 34–104)
ALT SERPL W P-5'-P-CCNC: 52 U/L (ref 7–52)
ANION GAP SERPL CALCULATED.3IONS-SCNC: 5 MMOL/L (ref 3–14)
APPEARANCE UR: CLEAR
AST SERPL W P-5'-P-CCNC: 30 U/L (ref 13–39)
BASOPHILS # BLD AUTO: 0.1 10E9/L (ref 0–0.2)
BASOPHILS NFR BLD AUTO: 0.9 %
BILIRUB SERPL-MCNC: 0.3 MG/DL (ref 0.3–1)
BILIRUB UR QL STRIP: NEGATIVE
BUN SERPL-MCNC: 14 MG/DL (ref 7–25)
CALCIUM SERPL-MCNC: 8.8 MG/DL (ref 8.6–10.3)
CHLORIDE SERPL-SCNC: 110 MMOL/L (ref 98–107)
CO2 SERPL-SCNC: 24 MMOL/L (ref 21–31)
COLOR UR AUTO: YELLOW
CREAT SERPL-MCNC: 0.72 MG/DL (ref 0.6–1.2)
DIFFERENTIAL METHOD BLD: NORMAL
EOSINOPHIL # BLD AUTO: 0.2 10E9/L (ref 0–0.7)
EOSINOPHIL NFR BLD AUTO: 3.1 %
ERYTHROCYTE [DISTWIDTH] IN BLOOD BY AUTOMATED COUNT: 12.2 % (ref 10–15)
GFR SERPL CREATININE-BSD FRML MDRD: >90 ML/MIN/{1.73_M2}
GLUCOSE SERPL-MCNC: 94 MG/DL (ref 70–105)
GLUCOSE UR STRIP-MCNC: NEGATIVE MG/DL
HCG UR QL: NEGATIVE
HCT VFR BLD AUTO: 38.8 % (ref 35–47)
HGB BLD-MCNC: 13 G/DL (ref 11.7–15.7)
HGB UR QL STRIP: NEGATIVE
IMM GRANULOCYTES # BLD: 0 10E9/L (ref 0–0.4)
IMM GRANULOCYTES NFR BLD: 0.2 %
KETONES UR STRIP-MCNC: ABNORMAL MG/DL
LACTATE SERPL-SCNC: 0.5 MMOL/L (ref 0.5–2.2)
LEUKOCYTE ESTERASE UR QL STRIP: NEGATIVE
LIPASE SERPL-CCNC: 58 U/L (ref 11–82)
LYMPHOCYTES # BLD AUTO: 2.8 10E9/L (ref 0.8–5.3)
LYMPHOCYTES NFR BLD AUTO: 44 %
MCH RBC QN AUTO: 30.1 PG (ref 26.5–33)
MCHC RBC AUTO-ENTMCNC: 33.5 G/DL (ref 31.5–36.5)
MCV RBC AUTO: 90 FL (ref 78–100)
MONOCYTES # BLD AUTO: 0.6 10E9/L (ref 0–1.3)
MONOCYTES NFR BLD AUTO: 9.3 %
NEUTROPHILS # BLD AUTO: 2.7 10E9/L (ref 1.6–8.3)
NEUTROPHILS NFR BLD AUTO: 42.5 %
NITRATE UR QL: NEGATIVE
PH UR STRIP: 6 PH (ref 5–9)
PLATELET # BLD AUTO: 279 10E9/L (ref 150–450)
POTASSIUM SERPL-SCNC: 3.8 MMOL/L (ref 3.5–5.1)
PROT SERPL-MCNC: 7.2 G/DL (ref 6.4–8.9)
RBC # BLD AUTO: 4.32 10E12/L (ref 3.8–5.2)
SODIUM SERPL-SCNC: 139 MMOL/L (ref 134–144)
SOURCE: ABNORMAL
SP GR UR STRIP: >1.03 (ref 1–1.03)
UROBILINOGEN UR STRIP-ACNC: 0.2 EU/DL (ref 0.2–1)
WBC # BLD AUTO: 6.4 10E9/L (ref 4–11)

## 2019-01-12 PROCEDURE — 83690 ASSAY OF LIPASE: CPT | Performed by: PHYSICIAN ASSISTANT

## 2019-01-12 PROCEDURE — 76830 TRANSVAGINAL US NON-OB: CPT

## 2019-01-12 PROCEDURE — 99284 EMERGENCY DEPT VISIT MOD MDM: CPT | Mod: Z6 | Performed by: PHYSICIAN ASSISTANT

## 2019-01-12 PROCEDURE — 25000128 H RX IP 250 OP 636: Performed by: PHYSICIAN ASSISTANT

## 2019-01-12 PROCEDURE — 85025 COMPLETE CBC W/AUTO DIFF WBC: CPT | Performed by: PHYSICIAN ASSISTANT

## 2019-01-12 PROCEDURE — 99285 EMERGENCY DEPT VISIT HI MDM: CPT | Mod: 25 | Performed by: PHYSICIAN ASSISTANT

## 2019-01-12 PROCEDURE — 83605 ASSAY OF LACTIC ACID: CPT | Performed by: PHYSICIAN ASSISTANT

## 2019-01-12 PROCEDURE — 80053 COMPREHEN METABOLIC PANEL: CPT | Performed by: PHYSICIAN ASSISTANT

## 2019-01-12 PROCEDURE — 96374 THER/PROPH/DIAG INJ IV PUSH: CPT | Performed by: PHYSICIAN ASSISTANT

## 2019-01-12 PROCEDURE — 81025 URINE PREGNANCY TEST: CPT | Performed by: PHYSICIAN ASSISTANT

## 2019-01-12 PROCEDURE — 81003 URINALYSIS AUTO W/O SCOPE: CPT | Performed by: PHYSICIAN ASSISTANT

## 2019-01-12 PROCEDURE — 36415 COLL VENOUS BLD VENIPUNCTURE: CPT | Performed by: PHYSICIAN ASSISTANT

## 2019-01-12 RX ORDER — FENTANYL CITRATE 50 UG/ML
50 INJECTION, SOLUTION INTRAMUSCULAR; INTRAVENOUS ONCE
Status: COMPLETED | OUTPATIENT
Start: 2019-01-12 | End: 2019-01-12

## 2019-01-12 RX ADMIN — SODIUM CHLORIDE 1000 ML: 900 INJECTION, SOLUTION INTRAVENOUS at 18:12

## 2019-01-12 RX ADMIN — FENTANYL CITRATE 50 MCG: 50 INJECTION, SOLUTION INTRAMUSCULAR; INTRAVENOUS at 18:13

## 2019-01-12 ASSESSMENT — ENCOUNTER SYMPTOMS
ABDOMINAL PAIN: 1
VOMITING: 0
NAUSEA: 0

## 2019-01-12 ASSESSMENT — MIFFLIN-ST. JEOR: SCORE: 1508.74

## 2019-01-12 NOTE — ED TRIAGE NOTES
Patient arrived via private car with boyfriend with complaints of a possible rupture of an ovarian cyst on the right side.  Patient was just diagnosed 3 days ago with the cyst.  Patient did have a little blood on her panties this am but nothing since.

## 2019-01-12 NOTE — ED AVS SNAPSHOT
[facility]  [address]  [place] [address]  Phone:  [phone]  Fax:  [phone]                                    Crys Hidalgo   MRN: [id number]    Department:  [facility]   Date of Visit:  1/12/2019           After Visit Summary Signature Page    I have received my discharge instructions, and my questions have been answered. I have discussed any challenges I see with this plan with the nurse or doctor.    ..........................................................................................................................................  Patient/Patient Representative Signature      ..........................................................................................................................................  Patient Representative Print Name and Relationship to Patient    ..................................................               ................................................  Date                                   Time    ..........................................................................................................................................  Reviewed by Signature/Title    ...................................................              ..............................................  Date                                               Time          22EPIC Rev 08/18

## 2019-01-13 NOTE — DISCHARGE INSTRUCTIONS
Get plenty of fluids and rest.  He can take Tylenol and ibuprofen as needed for discomfort.  Call make an appointment with your PCP early Monday morning.  Return to the ED if your symptoms are worsening such as increased pain, fevers.

## 2019-01-13 NOTE — ED PROVIDER NOTES
History     Chief Complaint   Patient presents with     Abdominal Pain     ovarian cyst     HPI  Crys Hidalgo is a 33 year old female who presents to the ED today with chief complaint of abdominal pain.  Patient reports that she has had 2-3 days of worsening right lower quadrant abdominal pain that increased in intensity this morning.  Patient was recently diagnosed with an ovarian cyst on her right side.  Patient reports a slight amount of spotting today as well.  Patient denies nausea or vomiting, diarrhea, dysuria, fevers.  Patient has no other complaints.    Problem List:    There are no active problems to display for this patient.       Past Medical History:    Past Medical History:   Diagnosis Date     Anxiety disorder      Major depressive disorder, single episode      Post-traumatic stress disorder        Past Surgical History:    No past surgical history on file.    Family History:    Family History   Problem Relation Age of Onset     Family History Negative Mother         Good Health     Family History Negative Father         Good Health     Family History Negative Daughter         Good Health     Family History Negative Son         Good Health     Family History Negative Daughter         Good Health     Family History Negative Daughter         Good Health       Social History:  Marital Status:  Significant other [7]  Social History     Tobacco Use     Smoking status: Current Some Day Smoker     Packs/day: 0.50     Types: Cigarettes     Smokeless tobacco: Never Used   Substance Use Topics     Alcohol use: No     Alcohol/week: 0.0 oz     Drug use: No        Medications:      busPIRone (BUSPAR) 10 MG tablet   cholecalciferol (VITAMIN D3) 5000 units TABS tablet   FLUoxetine (PROZAC) 40 MG capsule   HYDROcodone-acetaminophen (NORCO) 5-325 MG tablet   hydrOXYzine (VISTARIL) 25 MG capsule   imipramine (TOFRANIL) 25 MG tablet   metroNIDAZOLE (FLAGYL) 500 MG tablet   prazosin (MINIPRESS) 1 MG capsule  "  prazosin (MINIPRESS) 2 MG capsule         Review of Systems   Gastrointestinal: Positive for abdominal pain. Negative for nausea and vomiting.   Genitourinary:        Vaginal spotting   All other systems reviewed and are negative.      Physical Exam   BP: (!) 134/91  Pulse: 109  Temp: 97.9  F (36.6  C)  Resp: 20  Height: 170.2 cm (5' 7\")  Weight: 77.1 kg (170 lb)  SpO2: 98 %      Physical Exam   Constitutional: She appears well-developed and well-nourished. No distress.   HENT:   Head: Normocephalic and atraumatic.   Eyes: Conjunctivae and EOM are normal. Pupils are equal, round, and reactive to light. No scleral icterus.   Neck: Normal range of motion. Neck supple.   Cardiovascular: Normal rate, regular rhythm and normal heart sounds.   No murmur heard.  Pulmonary/Chest: Effort normal and breath sounds normal. No respiratory distress.   Abdominal: Soft. She exhibits no distension and no mass. There is tenderness.   Musculoskeletal: Normal range of motion. She exhibits no deformity.   Lymphadenopathy:     She has no cervical adenopathy.   Neurological: She is alert.   Skin: Skin is warm and dry. No rash noted. She is not diaphoretic.   Psychiatric: She has a normal mood and affect. Her behavior is normal. Judgment and thought content normal.       ED Course        Procedures               Critical Care time:  none               Results for orders placed or performed during the hospital encounter of 01/12/19 (from the past 24 hour(s))   *UA reflex to Microscopic   Result Value Ref Range    Color Urine Yellow     Appearance Urine Clear     Glucose Urine Negative NEG^Negative mg/dL    Bilirubin Urine Negative NEG^Negative    Ketones Urine Trace (A) NEG^Negative mg/dL    Specific Gravity Urine >1.030 (H) 1.000 - 1.030    Blood Urine Negative NEG^Negative    pH Urine 6.0 5.0 - 9.0 pH    Protein Albumin Urine Negative NEG^Negative mg/dL    Urobilinogen Urine 0.2 0.2 - 1.0 EU/dL    Nitrite Urine Negative NEG^Negative    " Leukocyte Esterase Urine Negative NEG^Negative    Source Midstream Urine    HCG qualitative urine   Result Value Ref Range    HCG Qual Urine Negative NEG^Negative   CBC with platelets differential   Result Value Ref Range    WBC 6.4 4.0 - 11.0 10e9/L    RBC Count 4.32 3.8 - 5.2 10e12/L    Hemoglobin 13.0 11.7 - 15.7 g/dL    Hematocrit 38.8 35.0 - 47.0 %    MCV 90 78 - 100 fl    MCH 30.1 26.5 - 33.0 pg    MCHC 33.5 31.5 - 36.5 g/dL    RDW 12.2 10.0 - 15.0 %    Platelet Count 279 150 - 450 10e9/L    Diff Method Automated Method     % Neutrophils 42.5 %    % Lymphocytes 44.0 %    % Monocytes 9.3 %    % Eosinophils 3.1 %    % Basophils 0.9 %    % Immature Granulocytes 0.2 %    Absolute Neutrophil 2.7 1.6 - 8.3 10e9/L    Absolute Lymphocytes 2.8 0.8 - 5.3 10e9/L    Absolute Monocytes 0.6 0.0 - 1.3 10e9/L    Absolute Eosinophils 0.2 0.0 - 0.7 10e9/L    Absolute Basophils 0.1 0.0 - 0.2 10e9/L    Abs Immature Granulocytes 0.0 0 - 0.4 10e9/L   Comprehensive metabolic panel   Result Value Ref Range    Sodium 139 134 - 144 mmol/L    Potassium 3.8 3.5 - 5.1 mmol/L    Chloride 110 (H) 98 - 107 mmol/L    Carbon Dioxide 24 21 - 31 mmol/L    Anion Gap 5 3 - 14 mmol/L    Glucose 94 70 - 105 mg/dL    Urea Nitrogen 14 7 - 25 mg/dL    Creatinine 0.72 0.60 - 1.20 mg/dL    GFR Estimate >90 >60 mL/min/[1.73_m2]    GFR Estimate If Black >90 >60 mL/min/[1.73_m2]    Calcium 8.8 8.6 - 10.3 mg/dL    Bilirubin Total 0.3 0.3 - 1.0 mg/dL    Albumin 4.1 3.5 - 5.7 g/dL    Protein Total 7.2 6.4 - 8.9 g/dL    Alkaline Phosphatase 69 34 - 104 U/L    ALT 52 7 - 52 U/L    AST 30 13 - 39 U/L   Lipase   Result Value Ref Range    Lipase 58 11 - 82 U/L   Lactic acid   Result Value Ref Range    Lactic Acid 0.5 0.5 - 2.2 mmol/L   US Pelvic Complete with Transvaginal    Narrative    PROCEDURE: US PELVIC COMPLETE WITH TRANSVAGINAL    HISTORY: RLQ tenderness, sudden onset, known cyst, torsion? free  fluid?    TECHNIQUE: Transabdominal and transvaginal ultrasound  of the pelvis.    COMPARISON: none    FINDINGS:     MEASUREMENTS:  Uterus: 9.3 x 5.3 x 5.3 cm (Length x Height x Width)  Endometrium 12 mm in thickness  Right Ovary: 2.5 x 2.1 x 3.0 cm (Length x Height x Width)  Left Ovary:  4.0 x 2.5 x 2.6 cm (Length x Height x Width)    UTERUS: The uterus appears normal. The endometrial thickness of 12 mm  to be normal for a late in the menstrual cycle.    Ovaries: There are some small follicles seen in both ovaries. In the  right ovary there is a 2.5 cm diameter cyst  There is arterial and  venous flow in both ovaries.        MISC: No free fluid is seen in the cul-de-sac      Impression    IMPRESSION: 2.5 cm diameter cyst right ovary otherwise normal  ultrasound of the pelvis    HELADIO SIMON MD       Medications   0.9% sodium chloride BOLUS (0 mLs Intravenous Stopped 1/12/19 1918)   fentaNYL (PF) (SUBLIMAZE) injection 50 mcg (50 mcg Intravenous Given 1/12/19 1813)       Assessments & Plan (with Medical Decision Making)   Patient seen and examined.  Patient is nontoxic-appearing no acute distress.  Heart, lung, bowel sounds normal.  Abdomen soft but tender to palpation in right lower quadrant.  At this time due to sudden onset and known cyst, I am concerned for possible ovarian torsion. Lab work and ultrasound ordered.  Patient given fluids, Zofran, fentanyl.    Patient had a negative urinalysis, unremarkable lab work.  Ultrasound revealed a 2.5 cm diameter cyst in right ovary, otherwise normal ultrasound of the pelvis.    Patient abdomen was reexamined.  She now appeared to have more general diffuse tenderness, nonfocal.  I discussed the results with the patient.  Due to her stable vital signs,  negative lab workup, and being afebrile it was decided through shared decision making the patient will try conservative treatment at this time.  Strict return precautions were given as far as increased abdominal pain and fevers.  If these occur patient should return the emergency  department for further management.  Otherwise, patient should call to follow-up with her PCP.  Patient understood and agree with plan patient was discharged.    Anton Smalls PA-C    I have reviewed the nursing notes.    I have reviewed the findings, diagnosis, plan and need for follow up with the patient.          Medication List      Discontinued    ibuprofen 800 MG tablet  Commonly known as:  ADVIL/MOTRIN            Final diagnoses:   Abdominal pain, right lower quadrant       1/12/2019   [facility]     Anton Smalls PA  01/12/19 1949

## 2019-02-22 ENCOUNTER — HOSPITAL ENCOUNTER (EMERGENCY)
Facility: OTHER | Age: 34
Discharge: HOME | End: 2019-02-22
Attending: EMERGENCY MEDICINE | Admitting: EMERGENCY MEDICINE
Payer: COMMERCIAL

## 2019-02-22 ENCOUNTER — APPOINTMENT (OUTPATIENT)
Dept: ULTRASOUND IMAGING | Facility: OTHER | Age: 34
End: 2019-02-22
Attending: EMERGENCY MEDICINE
Payer: COMMERCIAL

## 2019-02-22 VITALS
WEIGHT: 183 LBS | HEIGHT: 67 IN | RESPIRATION RATE: 16 BRPM | OXYGEN SATURATION: 99 % | HEART RATE: 71 BPM | DIASTOLIC BLOOD PRESSURE: 83 MMHG | SYSTOLIC BLOOD PRESSURE: 151 MMHG | BODY MASS INDEX: 28.72 KG/M2 | TEMPERATURE: 98.4 F

## 2019-02-22 DIAGNOSIS — Z34.90 INTRAUTERINE PREGNANCY: ICD-10-CM

## 2019-02-22 DIAGNOSIS — N73.0 PID (ACUTE PELVIC INFLAMMATORY DISEASE): ICD-10-CM

## 2019-02-22 LAB
ABO + RH BLD: NORMAL
ABO + RH BLD: NORMAL
ALBUMIN UR-MCNC: NEGATIVE MG/DL
APPEARANCE UR: CLEAR
B-HCG SERPL-ACNC: NORMAL IU/L
BILIRUB UR QL STRIP: NEGATIVE
C TRACH DNA SPEC QL PROBE+SIG AMP: NOT DETECTED
COLOR UR AUTO: YELLOW
GLUCOSE UR STRIP-MCNC: NEGATIVE MG/DL
HGB UR QL STRIP: ABNORMAL
KETONES UR STRIP-MCNC: NEGATIVE MG/DL
LEUKOCYTE ESTERASE UR QL STRIP: NEGATIVE
N GONORRHOEA DNA SPEC QL PROBE+SIG AMP: NOT DETECTED
NITRATE UR QL: NEGATIVE
NON-SQ EPI CELLS #/AREA URNS LPF: NORMAL /LPF
PH UR STRIP: 6 PH (ref 5–9)
RBC #/AREA URNS AUTO: NORMAL /HPF
SOURCE: ABNORMAL
SP GR UR STRIP: 1.01 (ref 1–1.03)
SPECIMEN EXP DATE BLD: NORMAL
SPECIMEN SOURCE: ABNORMAL
SPECIMEN SOURCE: NORMAL
UROBILINOGEN UR STRIP-ACNC: 0.2 EU/DL (ref 0.2–1)
WBC #/AREA URNS AUTO: NORMAL /HPF
WET PREP SPEC: ABNORMAL

## 2019-02-22 PROCEDURE — 25000132 ZZH RX MED GY IP 250 OP 250 PS 637: Performed by: EMERGENCY MEDICINE

## 2019-02-22 PROCEDURE — 86901 BLOOD TYPING SEROLOGIC RH(D): CPT | Performed by: EMERGENCY MEDICINE

## 2019-02-22 PROCEDURE — 87210 SMEAR WET MOUNT SALINE/INK: CPT | Performed by: EMERGENCY MEDICINE

## 2019-02-22 PROCEDURE — 36415 COLL VENOUS BLD VENIPUNCTURE: CPT | Performed by: FAMILY MEDICINE

## 2019-02-22 PROCEDURE — 99284 EMERGENCY DEPT VISIT MOD MDM: CPT | Mod: 25 | Performed by: EMERGENCY MEDICINE

## 2019-02-22 PROCEDURE — 76801 OB US < 14 WKS SINGLE FETUS: CPT

## 2019-02-22 PROCEDURE — 87491 CHLMYD TRACH DNA AMP PROBE: CPT | Performed by: EMERGENCY MEDICINE

## 2019-02-22 PROCEDURE — 84702 CHORIONIC GONADOTROPIN TEST: CPT | Performed by: FAMILY MEDICINE

## 2019-02-22 PROCEDURE — 81001 URINALYSIS AUTO W/SCOPE: CPT | Performed by: FAMILY MEDICINE

## 2019-02-22 PROCEDURE — 99283 EMERGENCY DEPT VISIT LOW MDM: CPT | Mod: Z6 | Performed by: EMERGENCY MEDICINE

## 2019-02-22 PROCEDURE — 87591 N.GONORRHOEAE DNA AMP PROB: CPT | Performed by: EMERGENCY MEDICINE

## 2019-02-22 PROCEDURE — 25000128 H RX IP 250 OP 636: Performed by: EMERGENCY MEDICINE

## 2019-02-22 PROCEDURE — 86900 BLOOD TYPING SEROLOGIC ABO: CPT | Performed by: EMERGENCY MEDICINE

## 2019-02-22 PROCEDURE — 96372 THER/PROPH/DIAG INJ SC/IM: CPT | Performed by: EMERGENCY MEDICINE

## 2019-02-22 RX ORDER — AZITHROMYCIN 250 MG/1
1000 TABLET, FILM COATED ORAL ONCE
Qty: 4 TABLET | Refills: 0 | Status: SHIPPED | OUTPATIENT
Start: 2019-03-01 | End: 2019-03-05

## 2019-02-22 RX ORDER — AZITHROMYCIN 250 MG/1
1000 TABLET, FILM COATED ORAL ONCE
Status: COMPLETED | OUTPATIENT
Start: 2019-02-22 | End: 2019-02-22

## 2019-02-22 RX ORDER — CEFTRIAXONE SODIUM 250 MG
250 VIAL (EA) INJECTION ONCE
Status: COMPLETED | OUTPATIENT
Start: 2019-02-22 | End: 2019-02-22

## 2019-02-22 RX ORDER — METRONIDAZOLE 500 MG/1
500 TABLET ORAL 2 TIMES DAILY
Qty: 28 TABLET | Refills: 0 | Status: SHIPPED | OUTPATIENT
Start: 2019-02-22 | End: 2019-03-05

## 2019-02-22 RX ADMIN — AZITHROMYCIN 1000 MG: 250 TABLET, FILM COATED ORAL at 20:50

## 2019-02-22 RX ADMIN — CEFTRIAXONE SODIUM 250 MG: 250 INJECTION, POWDER, FOR SOLUTION INTRAMUSCULAR; INTRAVENOUS at 20:50

## 2019-02-22 ASSESSMENT — MIFFLIN-ST. JEOR: SCORE: 1567.71

## 2019-02-22 NOTE — ED AVS SNAPSHOT
"    [facility]: [phone]                                              INTERAGENCY TRANSFER FORM - LAB / IMAGING / EKG / EMG RESULTS   2019                   Hospital Admission Date: 2019  ONOFRE DASILVA   : 1985  Sex: Female         Attending Provider:  Deandre Blanc MD    Allergies:  Adhesive Tape, Latex    Infection:  None   Service:  EMERGENCY ME    Ht:  1.702 m (5' 7\")   Wt:  83 kg (183 lb)   Admission Wt:  83 kg (183 lb)    BMI:  28.66 kg/m 2   BSA:  1.98 m 2            Patient PCP Information     Provider PCP Type    Melissa Velasco NP General    SONU Mc CNP Assigned PCP         Lab Results - 3 Days      Wet prep [[id number]] (Abnormal)  Resulted: 19, Result status: Final result   Ordering provider:  Deandre Blanc MD  19 183 Resulting lab:  [facility]    Specimen Information    Type Source Collected On   Vagina   19          Components    Component Value Reference Range Flag Lab   Specimen Description Vagina         Wet Prep Clue cells seen   A [facility]   Wet Prep Yeast seen   A [facility]   Wet Prep No Trichomonas seen     [facility]            GC/Chlamydia by PCR - HI, [[id number]]  Resulted: 19, Result status: In process   Ordering provider:  Deandre Blanc MD  19 1832 Resulting lab:  [facility]    Specimen Information    Type Source Collected On   Vaginal Swab   19            HCG quantitative pregnancy (blood) [[id number]]  Resulted: 19 1631, Result status: Final result   Ordering provider:  Deandre Blanc MD  19 1434 Resulting lab:  [facility]    Specimen Information    Type Source Collected On   Blood   19 1430          Components    Component Value Reference Range Flag Lab   HCG Quantitative Serum 46,620 IU/L   [facility]   Comment:         Comment:  Non-Pregnant      0 - 5                    "                  FOR GESTATIONAL ASSESSMENT-See table below                                     Approx. Gest. Age   Approx. HCG range                                     1-2 weeks            mIu/mL  2-3 weeks           100-5000 mIu/mL  3-4 weeks           500-23863 mIu/mL  4-5 weeks           1000-33522 mIu/mL  5-6 weeks           53485-500706 mIu/mL  6-8 weeks           68902-906080 mIu/mL  8-12 weeks          63192-271455 mIu/mL              Urine Microscopic [[id number]]  Resulted: 02/22/19 1447, Result status: Final result   Ordering provider:  Cristobal Rios MD  02/22/19 1435 Resulting lab:  [facility]    Specimen Information    Type Source Collected On       02/22/19 1435          Components    Component Value Reference Range Flag Lab   WBC Urine 0 - 5 OTO5^0 - 5 /HPF   [facility]   RBC Urine O - 2 OTO2^O - 2 /HPF   [facility]   Squamous Epithelial /LPF Urine Few FEW^Few /LPF   [facility]            UA reflex to Microscopic and Culture [[id number]] (Abnormal)  Resulted: 02/22/19 1443, Result status: Final result   Ordering provider:  Deandre Blanc MD  02/22/19 1431 Resulting lab:  [facility]    Specimen Information    Type Source Collected On   Midstream Urine Urine clean catch 02/22/19 1435          Components    Component Value Reference Range Flag Lab   Color Urine Yellow     [facility]   Appearance Urine Clear     [facility]   Glucose Urine Negative NEG^Negative mg/dL   [facility]   Bilirubin Urine Negative NEG^Negative   [facility]   Ketones Urine Negative NEG^Negative mg/dL   [facility]   Specific [place] Urine 1.010 1.000 - 1.030   [facility]   Blood Urine Trace NEG^Negative A [facility]   pH Urine 6.0 5.0 - 9.0 pH   [facility]   Protein Albumin Urine Negative NEG^Negative mg/dL   [facility]   Urobilinogen Urine 0.2 0.2 - 1.0 EU/dL   [facility]   Nitrite Urine Negative NEG^Negative   [facility]   Leukocyte Esterase Urine Negative  NEG^Negative   [facility]   Source Midstream Urine     [facility]            Testing Performed By     Lab - Abbreviation Name Director Address Valid Date Range    1743 - [facility] Unknown [address]  [facility] [address] 08/07/15 0948 - Present            Unresulted Labs (24h ago, onward)    None         Imaging Results - 3 Days      US OB < 14 Weeks w Transvaginal [[id number]]  Resulted: 02/22/19 1932, Result status: Final result   Ordering provider:  Deandre Blanc MD  02/22/19 1813 Resulted by:  Heladio Simon MD   Performed:  02/22/19 1847 - 02/22/19 1928 Accession number:  [id number]   Resulting lab:  RADIOLOGY RESULTS   Narrative:  PROCEDURE: US OB <14 WEEKS WITH TRANSVAGINAL SINGLE 2/22/2019 7:28 PM    HISTORY: vaginal bleeding. IUP on BSUS w/ good HR. Large R sided cyst?    COMPARISONS: None.    TECHNIQUE: Obstetrical ultrasound    FINDINGS: There is an intrauterine gestation. The crown-rump length  measured 13.7 mm corresponding to a gestational age of 7 weeks 5 days.  Fetal cardiac activity is measured 167 bpm. There is a 5 x 3 x 3 cm  diameter right adnexal cyst most likely a large corpus lutein cyst.        Impression:  IMPRESSION: Corpus lutein cyst. Live intrauterine gestation at 7 weeks  5 days    HELADIO SIMON MD      Testing Performed By     Lab - Abbreviation Name Director Address Valid Date Range    104 - [facility] RADIOLOGY RESULTS Unknown Unknown 02/16/05 1553 - Present            Encounter-Level Documents:    There are no encounter-level documents.     Order-Level Documents:    There are no order-level documents.

## 2019-02-22 NOTE — ED NOTES
"Patient reporting hx of ovarian cysts. No surgeries.  Had 3 cm cyst found about 4 weeks ago.  Uncertain if she is having problem with the cyst.  Had pain onset after going to the bathroom seeing pink blood when wiping then having onset of pain described as \"someone twisting my insides\" she states this is pain that is similar to past episodes of ovarian cyst pain.  States she has also been nauseated and had diarrhea.  "

## 2019-02-22 NOTE — ED TRIAGE NOTES
Pt had two positive home pregnancy tests and today she developed severe abd pain and and some spotting, pt does have known cyst and is not sure if she is having a miscarriage or if the cyst my have ruptured.    Tarsha Ramos RN on 2/22/2019 at 2:28 PM

## 2019-02-22 NOTE — ED AVS SNAPSHOT
[facility]  [address]  [place] [address]  Phone:  [phone]  Fax:  [phone]                                    Crys Hidalgo   MRN: [id number]    Department:  [facility]   Date of Visit:  2/22/2019           After Visit Summary Signature Page    I have received my discharge instructions, and my questions have been answered. I have discussed any challenges I see with this plan with the nurse or doctor.    ..........................................................................................................................................  Patient/Patient Representative Signature      ..........................................................................................................................................  Patient Representative Print Name and Relationship to Patient    ..................................................               ................................................  Date                                   Time    ..........................................................................................................................................  Reviewed by Signature/Title    ...................................................              ..............................................  Date                                               Time          22EPIC Rev 08/18

## 2019-02-23 NOTE — ED PROVIDER NOTES
Crys Hidalgo  : 1985 Age: 33 year old Sex: female MRN: [id number]    CC: Abdominal pain    HPI: Crys Hidalgo is a 33 year old  with a history of ovarian cysts and pain from this who presents with development of severe right-sided abdominal pain along with some spotting.  She states that she is currently pregnant and is not sure if she is having a miscarriage.  She states that the pain developed suddenly today, although she has been having some increased vaginal discharge recently, and was associated with some spotting when she wiped.  She describes the pain is similar to previous pain she has had from ovarian cysts.  She denies any chest pain or shortness of breath associated with this, no lightheadedness.    ED Course and MDM:  Abdominal pain: Patient is known to be pregnant a bedside ultrasound revealed a single intrauterine pregnancy with a heart rate of approximately 145.  There was a fairly large cystic structure noted at the right ovary, and therefore I sent her for formal ultrasound, which demonstrated a normal corpus luteum cyst that was simply large.  On pelvic exam she was noted to have mucopurulent vaginal discharge with some able to be expressed from the cervix and significant cervical motion tenderness with positive chandelier sign.  I discussed the management of PID and pregnancy with OB/GYN on-call and we elected to initiate outpatient antibiotics.  She was given a dose of IM ceftriaxone here, oral azithromycin, and discharged home on Flagyl, as she has a history of bacterial vaginosis and this may be a contributing organism to her PID.  She will follow-up with OB/GYN early next week.  Patient was discharged home in stable condition    Final Clinical Impression:  Intrauterine pregnancy, PID    Deandre Blanc MD  Internal Medicine and Emergency Medicine  7:56 PM 19      Physical Exam:  /55   Pulse 86   Temp 98.4  F (36.9  C) (Tympanic)   Resp 16    "Ht 1.702 m (5' 7\")   Wt 83 kg (183 lb)   LMP  (LMP Unknown)   SpO2 99%   Breastfeeding? No   BMI 28.66 kg/m      Gen: Awake, alert, NAD  HEENT: MMM, EOMI, PERRL  CV: RRR, WWP  Pulm: Nonlabored  Abd: Soft, tender RLQ without rebound or guarding  : Normal external genitalia, mucopurulent discharge in vaginal vault with some able to be expressed from cervix with pressure to uterus. Exquisite tenderness to movement of cervix - pt endorses this is far more than any usual tenderness to pelvic exam for her  Ext: Full ROM  Neuro: AOx3, no focal deficit  Psych: Appropriate affect, cognition intact    ROS:  10 point review of systems performed and negative except as noted in HPI.    Past Medical History:  Past Medical History:   Diagnosis Date     Anxiety disorder     No Comments Provided     Major depressive disorder, single episode     No Comments Provided     Post-traumatic stress disorder     No Comments Provided         Family history:  Family History   Problem Relation Age of Onset     Family History Negative Mother         Good Health     Family History Negative Father         Good Health     Family History Negative Daughter         Good Health     Family History Negative Son         Good Health     Family History Negative Daughter         Good Health     Family History Negative Daughter         Good Health         Social History:   Social History     Socioeconomic History     Marital status: Significant other     Spouse name: Not on file     Number of children: Not on file     Years of education: Not on file     Highest education level: Not on file   Occupational History     Not on file   Social Needs     Financial resource strain: Not on file     Food insecurity:     Worry: Not on file     Inability: Not on file     Transportation needs:     Medical: Not on file     Non-medical: Not on file   Tobacco Use     Smoking status: Current Some Day Smoker     Packs/day: 0.40     Types: Cigarettes     Smokeless " tobacco: Never Used   Substance and Sexual Activity     Alcohol use: No     Alcohol/week: 0.0 oz     Drug use: No     Sexual activity: Yes     Partners: Male     Birth control/protection: None   Lifestyle     Physical activity:     Days per week: Not on file     Minutes per session: Not on file     Stress: Not on file   Relationships     Social connections:     Talks on phone: Not on file     Gets together: Not on file     Attends Buddhist service: Not on file     Active member of club or organization: Not on file     Attends meetings of clubs or organizations: Not on file     Relationship status: Not on file     Intimate partner violence:     Fear of current or ex partner: Not on file     Emotionally abused: Not on file     Physically abused: Not on file     Forced sexual activity: Not on file   Other Topics Concern     Parent/sibling w/ CABG, MI or angioplasty before 65F 55M? Not Asked   Social History Narrative    Moved to this area in 2014 from [place] with her boyfriend. Significant other is currently in senior living.         Surgical History:  History reviewed. No pertinent surgical history.               Deandre Blanc MD  02/23/19 7102

## 2019-03-05 ENCOUNTER — PRENATAL OFFICE VISIT (OUTPATIENT)
Dept: OBGYN | Facility: OTHER | Age: 34
End: 2019-03-05
Attending: OBSTETRICS & GYNECOLOGY
Payer: COMMERCIAL

## 2019-03-05 VITALS
SYSTOLIC BLOOD PRESSURE: 116 MMHG | TEMPERATURE: 97.4 F | WEIGHT: 178 LBS | BODY MASS INDEX: 27.94 KG/M2 | HEIGHT: 67 IN | DIASTOLIC BLOOD PRESSURE: 74 MMHG | HEART RATE: 68 BPM

## 2019-03-05 DIAGNOSIS — B18.2 CHRONIC HEPATITIS C WITHOUT HEPATIC COMA (H): ICD-10-CM

## 2019-03-05 DIAGNOSIS — Z34.81 ENCOUNTER FOR SUPERVISION OF OTHER NORMAL PREGNANCY IN FIRST TRIMESTER: Primary | ICD-10-CM

## 2019-03-05 DIAGNOSIS — F19.11 HX OF SUBSTANCE ABUSE (H): ICD-10-CM

## 2019-03-05 LAB
ABO + RH BLD: NORMAL
ABO + RH BLD: NORMAL
AMPHETAMINES UR QL SCN: NOT DETECTED
BARBITURATES UR QL: NOT DETECTED
BENZODIAZ UR QL: NOT DETECTED
BLD GP AB SCN SERPL QL: NORMAL
BLOOD BANK CMNT PATIENT-IMP: NORMAL
BUPRENORPHINE UR QL: NOT DETECTED NG/ML
C TRACH DNA SPEC QL PROBE+SIG AMP: NOT DETECTED
CANNABINOIDS UR QL: NOT DETECTED NG/ML
COCAINE UR QL: NOT DETECTED
D-METHAMPHET UR QL: NOT DETECTED NG/ML
METHADONE UR QL SCN: NOT DETECTED
N GONORRHOEA DNA SPEC QL PROBE+SIG AMP: NOT DETECTED
OPIATES UR QL SCN: NOT DETECTED
OXYCODONE UR QL: NOT DETECTED NG/ML
PCP UR QL SCN: NOT DETECTED
PROPOXYPH UR QL: NOT DETECTED NG/ML
SPECIMEN EXP DATE BLD: NORMAL
SPECIMEN SOURCE: NORMAL
TRICYCLICS UR QL SCN: NOT DETECTED NG/ML

## 2019-03-05 PROCEDURE — G0463 HOSPITAL OUTPT CLINIC VISIT: HCPCS

## 2019-03-05 PROCEDURE — 87491 CHLMYD TRACH DNA AMP PROBE: CPT | Performed by: OBSTETRICS & GYNECOLOGY

## 2019-03-05 PROCEDURE — 99214 OFFICE O/P EST MOD 30 MIN: CPT | Performed by: OBSTETRICS & GYNECOLOGY

## 2019-03-05 PROCEDURE — 0064U ANTB TP TOTAL&RPR IA QUAL: CPT | Performed by: OBSTETRICS & GYNECOLOGY

## 2019-03-05 PROCEDURE — 87086 URINE CULTURE/COLONY COUNT: CPT | Performed by: OBSTETRICS & GYNECOLOGY

## 2019-03-05 PROCEDURE — 86850 RBC ANTIBODY SCREEN: CPT | Performed by: OBSTETRICS & GYNECOLOGY

## 2019-03-05 PROCEDURE — 80307 DRUG TEST PRSMV CHEM ANLYZR: CPT | Performed by: OBSTETRICS & GYNECOLOGY

## 2019-03-05 PROCEDURE — 87591 N.GONORRHOEAE DNA AMP PROB: CPT | Performed by: OBSTETRICS & GYNECOLOGY

## 2019-03-05 PROCEDURE — 36415 COLL VENOUS BLD VENIPUNCTURE: CPT | Performed by: OBSTETRICS & GYNECOLOGY

## 2019-03-05 PROCEDURE — 86901 BLOOD TYPING SEROLOGIC RH(D): CPT | Performed by: OBSTETRICS & GYNECOLOGY

## 2019-03-05 PROCEDURE — 86762 RUBELLA ANTIBODY: CPT | Performed by: OBSTETRICS & GYNECOLOGY

## 2019-03-05 PROCEDURE — 86900 BLOOD TYPING SEROLOGIC ABO: CPT | Performed by: OBSTETRICS & GYNECOLOGY

## 2019-03-05 PROCEDURE — 87340 HEPATITIS B SURFACE AG IA: CPT | Performed by: OBSTETRICS & GYNECOLOGY

## 2019-03-05 PROCEDURE — 87522 HEPATITIS C REVRS TRNSCRPJ: CPT | Performed by: OBSTETRICS & GYNECOLOGY

## 2019-03-05 PROCEDURE — 87389 HIV-1 AG W/HIV-1&-2 AB AG IA: CPT | Performed by: OBSTETRICS & GYNECOLOGY

## 2019-03-05 ASSESSMENT — PATIENT HEALTH QUESTIONNAIRE - PHQ9
SUM OF ALL RESPONSES TO PHQ QUESTIONS 1-9: 6
5. POOR APPETITE OR OVEREATING: SEVERAL DAYS

## 2019-03-05 ASSESSMENT — ANXIETY QUESTIONNAIRES
2. NOT BEING ABLE TO STOP OR CONTROL WORRYING: SEVERAL DAYS
7. FEELING AFRAID AS IF SOMETHING AWFUL MIGHT HAPPEN: SEVERAL DAYS
1. FEELING NERVOUS, ANXIOUS, OR ON EDGE: MORE THAN HALF THE DAYS
IF YOU CHECKED OFF ANY PROBLEMS ON THIS QUESTIONNAIRE, HOW DIFFICULT HAVE THESE PROBLEMS MADE IT FOR YOU TO DO YOUR WORK, TAKE CARE OF THINGS AT HOME, OR GET ALONG WITH OTHER PEOPLE: SOMEWHAT DIFFICULT
6. BECOMING EASILY ANNOYED OR IRRITABLE: MORE THAN HALF THE DAYS
GAD7 TOTAL SCORE: 11
5. BEING SO RESTLESS THAT IT IS HARD TO SIT STILL: MORE THAN HALF THE DAYS
3. WORRYING TOO MUCH ABOUT DIFFERENT THINGS: MORE THAN HALF THE DAYS

## 2019-03-05 ASSESSMENT — MIFFLIN-ST. JEOR: SCORE: 1545.03

## 2019-03-05 ASSESSMENT — PAIN SCALES - GENERAL: PAINLEVEL: NO PAIN (0)

## 2019-03-05 NOTE — PROGRESS NOTES
New Obstetrics Visit    HPI: 33 year old  at 9w2d by 7w5d US here today for initial OB visit. This was a unplanned pregnancy, but welcome. This is her first pregnancy with FOB- Cj. She denies cramping or VB. +breast tenderness.      She has a history of  delivery x1 when she was actively using methamphetamines, three subsequent full term deliveries without intervention.    OBHx  Obstetric History       T4      L5     SAB0   TAB0   Ectopic0   Multiple0   Live Births5       # Outcome Date GA Lbr Chris/2nd Weight Sex Delivery Anes PTL Lv   6 Current            5 Term 16   4.082 kg (9 lb) M  EPI  JAVAD      Name: Deirdre Boyer    4 Term 14   3.345 kg (7 lb 6 oz) F  EPI  JAVAD      Name: Dea Boyer    3 Term 12    F  EPI N       Name: Ayleen Singh   2  11   2.381 kg (5 lb 4 oz) M  EPI Y JAVAD      Name: Scottie Hidalgo       Complications: Fetal Intolerance   1 Term 10/31/08   3.629 kg (8 lb) F  EPI N JAVAD      Name: Shiloh Ortega            PMHx:   Past Medical History:   Diagnosis Date     Anxiety disorder     No Comments Provided     Borderline personality disorder (H)      Hepatitis C      Major depressive disorder, single episode     No Comments Provided     Post-traumatic stress disorder     No Comments Provided      PSHx: negative   Meds:   Current Outpatient Medications   Medication     busPIRone (BUSPAR) 10 MG tablet     cholecalciferol (VITAMIN D3) 5000 units TABS tablet     FLUoxetine (PROZAC) 40 MG capsule     hydrOXYzine (VISTARIL) 25 MG capsule     prazosin (MINIPRESS) 1 MG capsule     prazosin (MINIPRESS) 2 MG capsule     No current facility-administered medications for this visit.      Allergies:     Allergies   Allergen Reactions     Adhesive Tape      Latex Rash       SocHx:   Social History     Tobacco Use     Smoking status: Current Some Day Smoker     Packs/day: 0.40     Types: Cigarettes     Smokeless tobacco: Never Used      "Tobacco comment: cutting back   Substance Use Topics     Alcohol use: No     Alcohol/week: 0.0 oz     Drug use: No     Lives with boyfriend, Cj. S/p treatment for meth twice in the past year, last at [facility], finished 2018. Lost custody of her first three children due to meth use, has weekend visitation with her two youngest children, who live with their father.     FamHx:   Family History   Problem Relation Age of Onset     Family History Negative Mother         Good Health     Family History Negative Father         Good Health     Family History Negative Daughter         Good Health     Family History Negative Son         Good Health     Family History Negative Daughter         Good Health     Family History Negative Daughter         Good Health        ROS: 10-Point ROS negative except as noted in HPI      Physical Exam  /74 (BP Location: Right arm, Patient Position: Sitting, Cuff Size: Adult Regular)   Pulse 68   Temp 97.4  F (36.3  C) (Tympanic)   Ht 1.702 m (5' 7\")   Wt 80.7 kg (178 lb)   LMP  (LMP Unknown)   BMI 27.88 kg/m    Body mass index is 27.88 kg/m .  Gen: Well-appearing, NAD  HEENT: Normocephalic, atraumatic  Neck: Thyroid is not enlarged, no appreciable masses palpated. Non-tender  CV:  RRR, no m/r/g auscultated  Pulm: CTAB, no w/r/r auscultated  Abd: Soft, non-tender, non-distended  Ext: No LE edema, extremities warm and well perfused    Breast: Symmetric, no nipple discharge or retraction, no axillary lymphadenopathy, no palpable nodules or masses bilaterally    Pelvic:  Normal appearing external female genitalia. No vaginal lesions. Minimal white discharge. Cervix normal, no lesions. Uterus is 10 wk size, mobile, non-tender, anteverted. Fullness in right adnexa, nontender. Normal left adnexa.    Assessment/Plan:  Ms. Crys Hidalgo is a 33 year old  at 9w2d by 7w5d US, here for new OB visit. Pregnancy is complicated by Hepatitis C, hx of meth use, grand multiparity, " hx of  delivery in the setting of substance use.  1. Hepatitis C: viral load today  2. Hx of meth use: currently sober, UDS today. Will need random screening  3. Grand multiparity  4. New OB labs ord'd   5. Genetics: Desires, will draw at 16-18 weeks  6. Imaging: dating US at 7w5d  7. Immunizations: s/p flu   8. Hx of  delivery: in the setting of active drug use, three subsequent term deliveries without intervention. Low risk for recurrent  delivery and would not recommend Aneth    Follow up in 4 weeks.    Melinda Duran MD  OB/GYN  3/5/2019 1:26 PM

## 2019-03-05 NOTE — NURSING NOTE
No LMP recorded (lmp unknown). Patient is pregnant.  Medication Reconciliation: complete    Kizzy Chávez LPN  3/5/2019 1:19 PM

## 2019-03-06 LAB
BACTERIA SPEC CULT: NORMAL
SPECIMEN SOURCE: NORMAL

## 2019-03-06 ASSESSMENT — ANXIETY QUESTIONNAIRES: GAD7 TOTAL SCORE: 11

## 2019-03-07 LAB
HBV SURFACE AG SERPL QL IA: NONREACTIVE
HCV RNA SERPL NAA+PROBE-ACNC: ABNORMAL [IU]/ML
HCV RNA SERPL NAA+PROBE-LOG IU: 5.7 LOG IU/ML
HIV 1+2 AB+HIV1 P24 AG SERPL QL IA: NONREACTIVE
RUBV IGG SERPL IA-ACNC: 79 IU/ML
T PALLIDUM AB SER QL: NONREACTIVE

## 2019-04-09 ENCOUNTER — PRENATAL OFFICE VISIT (OUTPATIENT)
Dept: OBGYN | Facility: OTHER | Age: 34
End: 2019-04-09
Attending: OBSTETRICS & GYNECOLOGY
Payer: COMMERCIAL

## 2019-04-09 VITALS
SYSTOLIC BLOOD PRESSURE: 106 MMHG | HEART RATE: 80 BPM | DIASTOLIC BLOOD PRESSURE: 76 MMHG | RESPIRATION RATE: 20 BRPM | BODY MASS INDEX: 29.03 KG/M2 | WEIGHT: 185.38 LBS | TEMPERATURE: 97.5 F

## 2019-04-09 DIAGNOSIS — O09.92 SUPERVISION OF HIGH RISK PREGNANCY IN SECOND TRIMESTER: Primary | ICD-10-CM

## 2019-04-09 PROCEDURE — 99213 OFFICE O/P EST LOW 20 MIN: CPT | Performed by: OBSTETRICS & GYNECOLOGY

## 2019-04-09 PROCEDURE — G0463 HOSPITAL OUTPT CLINIC VISIT: HCPCS

## 2019-04-09 ASSESSMENT — PAIN SCALES - GENERAL: PAINLEVEL: NO PAIN (0)

## 2019-04-09 NOTE — NURSING NOTE
"Chief Complaint   Patient presents with     Prenatal Care     14w2d       Initial /76 (BP Location: Right arm, Patient Position: Sitting, Cuff Size: Adult Regular)   Pulse 80   Temp 97.5  F (36.4  C) (Tympanic)   Resp 20   Wt 84.1 kg (185 lb 6 oz)   LMP  (LMP Unknown)   BMI 29.03 kg/m   Estimated body mass index is 29.03 kg/m  as calculated from the following:    Height as of 3/5/19: 1.702 m (5' 7\").    Weight as of this encounter: 84.1 kg (185 lb 6 oz).  Medication Reconciliation: complete    Kizzy Chávez LPN  "

## 2019-05-07 ENCOUNTER — PRENATAL OFFICE VISIT (OUTPATIENT)
Dept: OBGYN | Facility: OTHER | Age: 34
End: 2019-05-07
Attending: OBSTETRICS & GYNECOLOGY
Payer: COMMERCIAL

## 2019-05-07 VITALS
BODY MASS INDEX: 29.87 KG/M2 | SYSTOLIC BLOOD PRESSURE: 122 MMHG | HEART RATE: 72 BPM | DIASTOLIC BLOOD PRESSURE: 71 MMHG | WEIGHT: 190.7 LBS

## 2019-05-07 DIAGNOSIS — O09.92 SUPERVISION OF HIGH RISK PREGNANCY IN SECOND TRIMESTER: Primary | ICD-10-CM

## 2019-05-07 PROCEDURE — 84999 UNLISTED CHEMISTRY PROCEDURE: CPT | Mod: ZL | Performed by: OBSTETRICS & GYNECOLOGY

## 2019-05-07 PROCEDURE — 36415 COLL VENOUS BLD VENIPUNCTURE: CPT | Performed by: OBSTETRICS & GYNECOLOGY

## 2019-05-07 PROCEDURE — G0463 HOSPITAL OUTPT CLINIC VISIT: HCPCS

## 2019-05-07 PROCEDURE — 99213 OFFICE O/P EST LOW 20 MIN: CPT | Performed by: OBSTETRICS & GYNECOLOGY

## 2019-05-07 PROCEDURE — 81511 FTL CGEN ABNOR FOUR ANAL: CPT | Mod: ZL | Performed by: OBSTETRICS & GYNECOLOGY

## 2019-05-07 PROCEDURE — 81329 SMN1 GENE DOS/DELETION ALYS: CPT | Mod: ZL | Performed by: OBSTETRICS & GYNECOLOGY

## 2019-05-07 PROCEDURE — 81220 CFTR GENE COM VARIANTS: CPT | Performed by: OBSTETRICS & GYNECOLOGY

## 2019-05-07 ASSESSMENT — PAIN SCALES - GENERAL: PAINLEVEL: NO PAIN (0)

## 2019-05-07 NOTE — PROGRESS NOTES
Return OB Visit    S: Patient has been feeling well. No cramping or VB. Starting to feel flutters.    O: /71 (BP Location: Right arm, Patient Position: Sitting, Cuff Size: Adult Regular)   Pulse 72   Wt 86.5 kg (190 lb 11.2 oz)   LMP  (LMP Unknown)   BMI 29.87 kg/m    Gen: Well-appearing, NAD  See OB Flowsheet    A/P:  Crys Hidalgo is a 34 year old  at 18w2d by 7w5d US, here for return OB visit.  Hx of  delivery: while actively using methamphetamines. 3 subsequent term deliveries without intervention.   Hx of meth use: s/p treatment twice in past year, sober since 2018. Negative UDS at new OB  Hepatitis C  Grand multiparity: desires tubal sterilization     PNC: Rh positive, Rubella immune  Genetics: desires- drawn 2019   Imaging: dating US at 7w5d  Immunizations: s/p flu  RTC 4 weeks      Melinda Duran MD  OB/GYN  2019 9:40 AM

## 2019-05-11 LAB
# FETUSES US: NORMAL
# FETUSES: NORMAL
AFP ADJ MOM AMN: 0.8
AFP SERPL-MCNC: 31 NG/ML
AGE - REPORTED: 34.5 YR
CURRENT SMOKER: YES
CURRENT SMOKER: YES
DIABETES STATUS PATIENT: NO
FAMILY MEMBER DISEASES HX: NO
FAMILY MEMBER DISEASES HX: NO
GA METHOD: NORMAL
GA METHOD: NORMAL
GA: NORMAL WK
HCG MOM SERPL: 0.55
HCG SERPL-ACNC: 9049 IU/L
HX OF HEREDITARY DISORDERS: NO
IDDM PATIENT QL: NO
INHIBIN A MOM SERPL: 0.82
INHIBIN A SERPL-MCNC: 165 PG/ML
INTEGRATED SCN PATIENT-IMP: NORMAL
IVF PREGNANCY: NO
LMP START DATE: NORMAL
MONOCHORIONIC TWINS: NO
PATHOLOGY STUDY: NORMAL
PREV FETUS DEFECT: NO
SERVICE CMNT-IMP: NO
SPECIMEN DRAWN SERPL: NORMAL
U ESTRIOL MOM SERPL: 1.32
U ESTRIOL SERPL-MCNC: 1.82 NG/ML
VALPROIC/CARBAMAZEPINE STATUS: NO
WEIGHT UNITS: NORMAL

## 2019-05-16 LAB
RESULT: NORMAL
SEND OUTS MISC TEST CODE: NORMAL
SEND OUTS MISC TEST SPECIMEN: NORMAL
TEST NAME: NORMAL

## 2019-05-21 ENCOUNTER — HOSPITAL ENCOUNTER (OUTPATIENT)
Dept: ULTRASOUND IMAGING | Facility: OTHER | Age: 34
Discharge: HOME | End: 2019-05-21
Attending: OBSTETRICS & GYNECOLOGY | Admitting: OBSTETRICS & GYNECOLOGY
Payer: COMMERCIAL

## 2019-05-21 DIAGNOSIS — O09.92 SUPERVISION OF HIGH RISK PREGNANCY IN SECOND TRIMESTER: ICD-10-CM

## 2019-05-21 LAB
CFTR ALLELE 2 BLD/T QL: NEGATIVE
CFTR P.R117H+5T VAR BLD/T QL: NORMAL
CYSTIC FIBROSIS 165 VARIANT INTERP: NORMAL

## 2019-05-21 PROCEDURE — 76805 OB US >/= 14 WKS SNGL FETUS: CPT

## 2019-05-23 ENCOUNTER — OFFICE VISIT (OUTPATIENT)
Dept: FAMILY MEDICINE | Facility: OTHER | Age: 34
End: 2019-05-23
Attending: NURSE PRACTITIONER
Payer: COMMERCIAL

## 2019-05-23 VITALS
SYSTOLIC BLOOD PRESSURE: 116 MMHG | BODY MASS INDEX: 29.68 KG/M2 | HEIGHT: 67 IN | WEIGHT: 189.1 LBS | RESPIRATION RATE: 16 BRPM | DIASTOLIC BLOOD PRESSURE: 55 MMHG | TEMPERATURE: 97.6 F | HEART RATE: 82 BPM | OXYGEN SATURATION: 98 %

## 2019-05-23 DIAGNOSIS — K02.9 DENTAL CARIES: Primary | ICD-10-CM

## 2019-05-23 PROCEDURE — 99214 OFFICE O/P EST MOD 30 MIN: CPT | Performed by: NURSE PRACTITIONER

## 2019-05-23 PROCEDURE — G0463 HOSPITAL OUTPT CLINIC VISIT: HCPCS

## 2019-05-23 RX ORDER — AMOXICILLIN 500 MG/1
500 TABLET, FILM COATED ORAL 3 TIMES DAILY
Qty: 30 TABLET | Refills: 0 | Status: SHIPPED | OUTPATIENT
Start: 2019-05-23 | End: 2019-06-02

## 2019-05-23 ASSESSMENT — PAIN SCALES - GENERAL: PAINLEVEL: EXTREME PAIN (9)

## 2019-05-23 ASSESSMENT — ENCOUNTER SYMPTOMS
COUGH: 0
SORE THROAT: 0
NECK PAIN: 0
CONSTITUTIONAL NEGATIVE: 1
SINUS PAIN: 0

## 2019-05-23 ASSESSMENT — MIFFLIN-ST. JEOR: SCORE: 1590.38

## 2019-05-23 NOTE — NURSING NOTE
"Patient presents to clinic for left sided dental pain x2 days. Very sensitive to cold or heat in front of mouth. Tooth in front feels loose.   Chief Complaint   Patient presents with     Dental Pain       Initial /55 (BP Location: Right arm, Patient Position: Sitting, Cuff Size: Adult Regular)   Pulse 82   Temp 97.6  F (36.4  C) (Tympanic)   Resp 16   Ht 1.702 m (5' 7\")   Wt 85.8 kg (189 lb 1.6 oz)   LMP  (LMP Unknown)   SpO2 98%   BMI 29.62 kg/m   Estimated body mass index is 29.62 kg/m  as calculated from the following:    Height as of this encounter: 1.702 m (5' 7\").    Weight as of this encounter: 85.8 kg (189 lb 1.6 oz).  Medication Reconciliation: complete    Traci Oshea LPN    "

## 2019-05-23 NOTE — PROGRESS NOTES
SUBJECTIVE:   Crys Hidalgo is a 34 year old female who presents to clinic today for the following health issues:    HPI  Presents with dental pain for 2 days. No swelling or drainage.   Currently 21 weeks pregnant, has a lot of pain in the front of her mouth.   Will be calling for an appointment tomorrow. Taking Tylenol for pain.     Patient Active Problem List    Diagnosis Date Noted     Supervision of high risk pregnancy in second trimester 04/09/2019     Priority: Medium     Past Medical History:   Diagnosis Date     Anxiety disorder     No Comments Provided     Borderline personality disorder (H)      Hepatitis C      Major depressive disorder, single episode     No Comments Provided     Post-traumatic stress disorder     No Comments Provided      History reviewed. No pertinent surgical history.  Family History   Problem Relation Age of Onset     Family History Negative Mother         Good Health     Family History Negative Father         Good Health     Family History Negative Daughter         Good Health     Family History Negative Son         Good Health     Family History Negative Daughter         Good Health     Family History Negative Daughter         Good Health     Social History     Tobacco Use     Smoking status: Current Some Day Smoker     Packs/day: 0.40     Types: Cigarettes     Smokeless tobacco: Never Used     Tobacco comment: not right now   Substance Use Topics     Alcohol use: No     Alcohol/week: 0.0 oz     Social History     Social History Narrative    Moved to this area in 2014 from [place] with her boyfriend. Significant other is currently in longterm.     Current Outpatient Medications   Medication Sig Dispense Refill     amoxicillin (AMOXIL) 500 MG tablet Take 1 tablet (500 mg) by mouth 3 times daily for 10 days 30 tablet 0     busPIRone (BUSPAR) 10 MG tablet Take 5-10 mg by mouth       cholecalciferol (VITAMIN D3) 5000 units TABS tablet        FLUoxetine (PROZAC) 40 MG capsule  "Take 40 mg by mouth       hydrOXYzine (VISTARIL) 25 MG capsule Take 25-50 mg by mouth       prazosin (MINIPRESS) 1 MG capsule TAKE 1 CAPSULE BY MOUTH EVERY EVENING ALONG WITH A 2MG CAPSULE FOR ATOTAL DAILY DOSE OF 3MG.       prazosin (MINIPRESS) 2 MG capsule TAKE 1 CAP BY MOUTH EVERY EVENING ALONG WITH A 1MG CAPSULE FOR A TOTAL DAILY DOSE OF 3MG       Allergies   Allergen Reactions     Adhesive Tape      Latex Rash       Review of Systems   Constitutional: Negative.    HENT: Positive for dental problem. Negative for sinus pain and sore throat.    Respiratory: Negative for cough.    Musculoskeletal: Negative for neck pain.   Skin: Negative.         OBJECTIVE:     /55 (BP Location: Right arm, Patient Position: Sitting, Cuff Size: Adult Regular)   Pulse 82   Temp 97.6  F (36.4  C) (Tympanic)   Resp 16   Ht 1.702 m (5' 7\")   Wt 85.8 kg (189 lb 1.6 oz)   LMP  (LMP Unknown)   SpO2 98%   BMI 29.62 kg/m    Body mass index is 29.62 kg/m .  Physical Exam   Constitutional: She is oriented to person, place, and time. She appears well-developed and well-nourished. No distress.   HENT:   Head: Normocephalic and atraumatic.   Right Ear: Tympanic membrane and external ear normal.   Left Ear: Tympanic membrane and external ear normal.   Nose: Nose normal.   Mouth/Throat: Uvula is midline, oropharynx is clear and moist and mucous membranes are normal. Dental caries present.       No facial swelling or pain.   Eyes: Conjunctivae are normal. Right eye exhibits no discharge. Left eye exhibits no discharge. No scleral icterus.   Neck: Normal range of motion. Neck supple.   Cardiovascular: Normal rate.   Pulmonary/Chest: Effort normal.   Musculoskeletal: Normal range of motion.   Lymphadenopathy:     She has no cervical adenopathy.   Neurological: She is alert and oriented to person, place, and time.   Skin: Skin is warm and dry. She is not diaphoretic.   Psychiatric: She has a normal mood and affect. Her behavior is normal. "       Diagnostic Test Results:  No results found for this or any previous visit (from the past 24 hour(s)).    ASSESSMENT/PLAN:       ICD-10-CM    1. Dental caries K02.9 amoxicillin (AMOXIL) 500 MG tablet      PLAN:  Patient 21 weeks pregnant with dental caries, has increasing dental pain.   No evidence of abscess. D/t pregnancy state, and worsening symptoms will treat her with amoxicillin.   Stressed importance of f/u with dentist. Advised to contact PCP if she wasn't able to get in with a local dentist in a timely manner.   Monitor symptoms, see PCP in 3 days if not improving, return to ER if sx worsen.   Given Epic educational materials.   I explained my diagnostic considerations and recommendations to pt who voiced understanding and agreement with the treatment plan. All questions were answered. We discussed potential side effects of any prescribed or recommended therapies, as well as expectations for response to treatments.    Disclaimer:  This note consists of words and symbols derived from keyboarding, dictation, or using voice recognition software. As a result, there may be errors in the script that have gone undetected. Please consider this when interpreting information found in this note.      SONU Gusman, NP-C  5/23/2019 at 4:55 PM  [facility]

## 2019-05-23 NOTE — PATIENT INSTRUCTIONS
"Call dentist for appointment ASAP.   Keep up with regular good oral hygiene.   Take antibiotics as ordered.       Patient Education     Dental Cavity    A dental cavity is a pit or crater in the surface of a tooth. This exposes the sensitive inner layer of the tooth and causes pain. If the cavity isn t treated, it will get bigger. It may enter the pulp and cause an infection or abscess in the bone at the root end (apex) of the tooth. An infection in the tooth is a much more serious problem than a cavity. If the tooth gets infected, you will need a root canal or the entire tooth taken out (extraction).  The pain in your tooth may be made worse by eating sweets or drinking hot or cold beverages. It may spread from the tooth to your ear or the area of your jaw on the same side.  Home care  Follow these tips when caring for yourself at home:    Don't have sweets and hot and cold foods and drinks. Your tooth may be sensitive to changes in temperature.    If your tooth is chipped or cracked, or if there is a large open cavity, put oil of cloves directly on the tooth to relieve pain. You can buy oil of cloves at drugstores. Some pharmacies carry an over-the-counter \"toothache kit.\" This contains a paste that you can put on the exposed tooth to make it less sensitive.    Put a cold pack on your jaw over the sore area to help reduce pain.    You may use over-the-counter medicine to ease pain, unless another medicine was prescribed. If you have chronic liver or kidney disease, talk with your healthcare provider before using acetaminophen or ibuprofen. Also talk with your provider if you ve had a stomach ulcer or GI bleeding.    If you have signs of an infection, you will be given an antibiotic. Take it as directed.  Follow-up care  Follow up with your dentist, or as advised. Your pain may go away with the treatment given today. But only a dentist can fully look at and treat this problem to prevent further tooth damage.  Call " 911  Call 911 if any of these occur:    Difficulty swallowing or breathing    Weakness or fainting    Unusual drowsiness    Headache or stiff neck  When to seek medical advice  Call your healthcare provider right away if any of these occur:    Redness or swelling of the face    Pain gets worse or spreads to your neck    Fever of 100.4  F (38 C) or higher, or as directed by your healthcare provider    Pus drains from the tooth or gum  Date Last Reviewed: 10/1/2016    [id number] [employer]. [address] [place] [address]. All rights reserved. This information is not intended as a substitute for professional medical care. Always follow your healthcare professional's instructions.

## 2019-06-26 DIAGNOSIS — O09.92 SUPERVISION OF HIGH RISK PREGNANCY IN SECOND TRIMESTER: Primary | ICD-10-CM

## 2019-06-26 NOTE — PROGRESS NOTES
Order placed for US OB per recommendation by ANDREE.       Elyse NARVAEZN, RN ....... 6/26/2019 at 10:34 AM

## (undated) RX ORDER — AZITHROMYCIN 250 MG/1
TABLET, FILM COATED ORAL
Status: DISPENSED
Start: 2019-02-22

## (undated) RX ORDER — FENTANYL CITRATE 50 UG/ML
INJECTION, SOLUTION INTRAMUSCULAR; INTRAVENOUS
Status: DISPENSED
Start: 2019-01-03

## (undated) RX ORDER — KETOROLAC TROMETHAMINE 30 MG/ML
INJECTION, SOLUTION INTRAMUSCULAR; INTRAVENOUS
Status: DISPENSED
Start: 2019-01-03

## (undated) RX ORDER — SODIUM CHLORIDE 9 MG/ML
INJECTION, SOLUTION INTRAVENOUS
Status: DISPENSED
Start: 2019-01-12

## (undated) RX ORDER — FENTANYL CITRATE 50 UG/ML
INJECTION, SOLUTION INTRAMUSCULAR; INTRAVENOUS
Status: DISPENSED
Start: 2019-01-12

## (undated) RX ORDER — LIDOCAINE HYDROCHLORIDE 10 MG/ML
INJECTION, SOLUTION EPIDURAL; INFILTRATION; INTRACAUDAL; PERINEURAL
Status: DISPENSED
Start: 2019-02-22

## (undated) RX ORDER — CEFTRIAXONE SODIUM 250 MG/1
INJECTION, POWDER, FOR SOLUTION INTRAMUSCULAR; INTRAVENOUS
Status: DISPENSED
Start: 2019-02-22

## (undated) RX ORDER — CYCLOBENZAPRINE HCL 10 MG
TABLET ORAL
Status: DISPENSED
Start: 2019-01-03